# Patient Record
Sex: FEMALE | Race: WHITE | NOT HISPANIC OR LATINO
[De-identification: names, ages, dates, MRNs, and addresses within clinical notes are randomized per-mention and may not be internally consistent; named-entity substitution may affect disease eponyms.]

---

## 2017-01-19 ENCOUNTER — MESSAGE (OUTPATIENT)
Age: 57
End: 2017-01-19

## 2017-01-19 ENCOUNTER — MEDICATION RENEWAL (OUTPATIENT)
Age: 57
End: 2017-01-19

## 2017-03-07 ENCOUNTER — MESSAGE (OUTPATIENT)
Age: 57
End: 2017-03-07

## 2017-03-08 ENCOUNTER — MEDICATION RENEWAL (OUTPATIENT)
Age: 57
End: 2017-03-08

## 2017-03-13 ENCOUNTER — CLINICAL ADVICE (OUTPATIENT)
Age: 57
End: 2017-03-13

## 2017-05-04 ENCOUNTER — APPOINTMENT (OUTPATIENT)
Dept: PAIN MANAGEMENT | Facility: CLINIC | Age: 57
End: 2017-05-04

## 2017-05-04 VITALS
HEIGHT: 64 IN | BODY MASS INDEX: 26.63 KG/M2 | WEIGHT: 156 LBS | SYSTOLIC BLOOD PRESSURE: 118 MMHG | HEART RATE: 94 BPM | DIASTOLIC BLOOD PRESSURE: 76 MMHG

## 2017-05-04 RX ORDER — CYCLOBENZAPRINE HYDROCHLORIDE 10 MG/1
10 TABLET, FILM COATED ORAL DAILY
Refills: 0 | Status: ACTIVE | COMMUNITY
Start: 2017-05-04

## 2017-05-04 RX ORDER — DIHYDROERGOTAMINE MESYLATE 4 MG/ML
4 SPRAY NASAL
Qty: 16 | Refills: 2 | Status: DISCONTINUED | COMMUNITY
Start: 2017-03-13 | End: 2017-05-04

## 2017-06-08 ENCOUNTER — APPOINTMENT (OUTPATIENT)
Dept: PAIN MANAGEMENT | Facility: CLINIC | Age: 57
End: 2017-06-08

## 2017-06-26 ENCOUNTER — APPOINTMENT (OUTPATIENT)
Dept: PAIN MANAGEMENT | Facility: CLINIC | Age: 57
End: 2017-06-26

## 2017-06-26 VITALS
DIASTOLIC BLOOD PRESSURE: 80 MMHG | HEART RATE: 77 BPM | HEIGHT: 64 IN | BODY MASS INDEX: 25.95 KG/M2 | WEIGHT: 152 LBS | SYSTOLIC BLOOD PRESSURE: 126 MMHG

## 2017-06-26 RX ORDER — OLANZAPINE 5 MG/1
5 TABLET, FILM COATED ORAL
Qty: 14 | Refills: 0 | Status: DISCONTINUED | COMMUNITY
Start: 2017-03-16 | End: 2017-06-26

## 2017-07-31 ENCOUNTER — APPOINTMENT (OUTPATIENT)
Dept: PAIN MANAGEMENT | Facility: CLINIC | Age: 57
End: 2017-07-31
Payer: COMMERCIAL

## 2017-07-31 VITALS
BODY MASS INDEX: 25.95 KG/M2 | HEIGHT: 64 IN | WEIGHT: 152 LBS | HEART RATE: 87 BPM | DIASTOLIC BLOOD PRESSURE: 78 MMHG | SYSTOLIC BLOOD PRESSURE: 127 MMHG

## 2017-07-31 PROCEDURE — 99214 OFFICE O/P EST MOD 30 MIN: CPT

## 2017-07-31 RX ORDER — AZITHROMYCIN 250 MG/1
250 TABLET, FILM COATED ORAL
Qty: 6 | Refills: 0 | Status: COMPLETED | COMMUNITY
Start: 2016-12-27

## 2017-07-31 RX ORDER — NABUMETONE 750 MG/1
750 TABLET, FILM COATED ORAL
Qty: 45 | Refills: 1 | Status: DISCONTINUED | COMMUNITY
Start: 2017-05-04 | End: 2017-07-31

## 2017-08-28 ENCOUNTER — MEDICATION RENEWAL (OUTPATIENT)
Age: 57
End: 2017-08-28

## 2017-08-29 ENCOUNTER — MESSAGE (OUTPATIENT)
Age: 57
End: 2017-08-29

## 2017-09-25 ENCOUNTER — APPOINTMENT (OUTPATIENT)
Dept: PAIN MANAGEMENT | Facility: CLINIC | Age: 57
End: 2017-09-25
Payer: COMMERCIAL

## 2017-09-25 VITALS
DIASTOLIC BLOOD PRESSURE: 81 MMHG | HEART RATE: 95 BPM | SYSTOLIC BLOOD PRESSURE: 128 MMHG | HEIGHT: 64 IN | WEIGHT: 141 LBS | BODY MASS INDEX: 24.07 KG/M2

## 2017-09-25 PROCEDURE — 99213 OFFICE O/P EST LOW 20 MIN: CPT

## 2017-09-25 RX ORDER — PANTOPRAZOLE 40 MG/1
40 TABLET, DELAYED RELEASE ORAL
Qty: 30 | Refills: 0 | Status: DISCONTINUED | COMMUNITY
Start: 2016-03-07 | End: 2017-09-25

## 2017-11-06 ENCOUNTER — MEDICATION RENEWAL (OUTPATIENT)
Age: 57
End: 2017-11-06

## 2017-12-04 ENCOUNTER — APPOINTMENT (OUTPATIENT)
Dept: PAIN MANAGEMENT | Facility: CLINIC | Age: 57
End: 2017-12-04
Payer: COMMERCIAL

## 2017-12-04 VITALS
WEIGHT: 142 LBS | HEIGHT: 64 IN | BODY MASS INDEX: 24.24 KG/M2 | SYSTOLIC BLOOD PRESSURE: 122 MMHG | HEART RATE: 90 BPM | DIASTOLIC BLOOD PRESSURE: 88 MMHG

## 2017-12-04 PROCEDURE — 99213 OFFICE O/P EST LOW 20 MIN: CPT

## 2017-12-06 ENCOUNTER — CHART COPY (OUTPATIENT)
Age: 57
End: 2017-12-06

## 2018-02-15 ENCOUNTER — APPOINTMENT (OUTPATIENT)
Dept: PAIN MANAGEMENT | Facility: CLINIC | Age: 58
End: 2018-02-15

## 2018-02-21 ENCOUNTER — APPOINTMENT (OUTPATIENT)
Dept: PAIN MANAGEMENT | Facility: CLINIC | Age: 58
End: 2018-02-21
Payer: COMMERCIAL

## 2018-02-21 VITALS
BODY MASS INDEX: 24.41 KG/M2 | DIASTOLIC BLOOD PRESSURE: 74 MMHG | WEIGHT: 143 LBS | SYSTOLIC BLOOD PRESSURE: 120 MMHG | HEART RATE: 90 BPM | HEIGHT: 64 IN

## 2018-02-21 PROCEDURE — 99213 OFFICE O/P EST LOW 20 MIN: CPT

## 2018-04-03 ENCOUNTER — MEDICATION RENEWAL (OUTPATIENT)
Age: 58
End: 2018-04-03

## 2018-04-11 ENCOUNTER — APPOINTMENT (OUTPATIENT)
Dept: PAIN MANAGEMENT | Facility: CLINIC | Age: 58
End: 2018-04-11
Payer: COMMERCIAL

## 2018-04-11 VITALS
HEIGHT: 64 IN | SYSTOLIC BLOOD PRESSURE: 119 MMHG | DIASTOLIC BLOOD PRESSURE: 76 MMHG | WEIGHT: 145 LBS | BODY MASS INDEX: 24.75 KG/M2 | HEART RATE: 84 BPM

## 2018-04-11 PROCEDURE — 99213 OFFICE O/P EST LOW 20 MIN: CPT

## 2018-04-20 ENCOUNTER — OTHER (OUTPATIENT)
Age: 58
End: 2018-04-20

## 2018-04-27 ENCOUNTER — APPOINTMENT (OUTPATIENT)
Dept: MRI IMAGING | Facility: IMAGING CENTER | Age: 58
End: 2018-04-27

## 2018-05-09 ENCOUNTER — APPOINTMENT (OUTPATIENT)
Dept: PAIN MANAGEMENT | Facility: CLINIC | Age: 58
End: 2018-05-09
Payer: COMMERCIAL

## 2018-05-09 VITALS
BODY MASS INDEX: 24.75 KG/M2 | HEART RATE: 85 BPM | HEIGHT: 64 IN | WEIGHT: 145 LBS | SYSTOLIC BLOOD PRESSURE: 126 MMHG | DIASTOLIC BLOOD PRESSURE: 82 MMHG

## 2018-05-09 PROCEDURE — 99213 OFFICE O/P EST LOW 20 MIN: CPT

## 2018-06-14 ENCOUNTER — APPOINTMENT (OUTPATIENT)
Dept: PAIN MANAGEMENT | Facility: CLINIC | Age: 58
End: 2018-06-14
Payer: COMMERCIAL

## 2018-06-14 VITALS
HEART RATE: 87 BPM | BODY MASS INDEX: 24.75 KG/M2 | HEIGHT: 64 IN | SYSTOLIC BLOOD PRESSURE: 111 MMHG | DIASTOLIC BLOOD PRESSURE: 73 MMHG | WEIGHT: 145 LBS

## 2018-06-14 PROCEDURE — 99214 OFFICE O/P EST MOD 30 MIN: CPT

## 2018-06-14 RX ORDER — ICOSAPENT ETHYL 1000 MG/1
1 CAPSULE ORAL
Qty: 360 | Refills: 0 | Status: ACTIVE | COMMUNITY
Start: 2018-01-17

## 2018-06-14 RX ORDER — NABUMETONE 750 MG/1
750 TABLET, FILM COATED ORAL
Qty: 14 | Refills: 1 | Status: DISCONTINUED | COMMUNITY
Start: 2017-12-04 | End: 2018-06-14

## 2018-06-14 RX ORDER — ALBUTEROL SULFATE 90 UG/1
108 (90 BASE) AEROSOL, METERED RESPIRATORY (INHALATION)
Qty: 9 | Refills: 0 | Status: ACTIVE | COMMUNITY
Start: 2018-02-01

## 2018-07-02 ENCOUNTER — APPOINTMENT (OUTPATIENT)
Dept: PAIN MANAGEMENT | Facility: CLINIC | Age: 58
End: 2018-07-02
Payer: COMMERCIAL

## 2018-07-02 VITALS
SYSTOLIC BLOOD PRESSURE: 114 MMHG | HEART RATE: 75 BPM | HEIGHT: 64 IN | WEIGHT: 145 LBS | BODY MASS INDEX: 24.75 KG/M2 | DIASTOLIC BLOOD PRESSURE: 75 MMHG

## 2018-07-02 PROCEDURE — 99212 OFFICE O/P EST SF 10 MIN: CPT | Mod: 25

## 2018-07-02 PROCEDURE — 20551 NJX 1 TENDON ORIGIN/INSJ: CPT

## 2018-10-29 RX ORDER — ERENUMAB-AOOE 70 MG/ML
70 INJECTION SUBCUTANEOUS
Qty: 1 | Refills: 5 | Status: DISCONTINUED | COMMUNITY
Start: 2018-06-14 | End: 2018-10-29

## 2018-12-24 ENCOUNTER — APPOINTMENT (OUTPATIENT)
Dept: NEUROLOGY | Facility: CLINIC | Age: 58
End: 2018-12-24

## 2019-02-06 ENCOUNTER — APPOINTMENT (OUTPATIENT)
Dept: PAIN MANAGEMENT | Facility: CLINIC | Age: 59
End: 2019-02-06
Payer: MEDICAID

## 2019-02-06 VITALS
DIASTOLIC BLOOD PRESSURE: 69 MMHG | SYSTOLIC BLOOD PRESSURE: 102 MMHG | HEART RATE: 79 BPM | HEIGHT: 64 IN | BODY MASS INDEX: 24.92 KG/M2 | WEIGHT: 146 LBS

## 2019-02-06 PROCEDURE — 99213 OFFICE O/P EST LOW 20 MIN: CPT

## 2019-02-06 RX ORDER — DULOXETINE HYDROCHLORIDE 20 MG/1
20 CAPSULE, DELAYED RELEASE PELLETS ORAL
Qty: 30 | Refills: 0 | Status: DISCONTINUED | COMMUNITY
Start: 2018-02-21 | End: 2019-02-06

## 2019-02-06 RX ORDER — PROTRIPTYLINE HYDROCHLORIDE 10 MG/1
10 TABLET, FILM COATED ORAL
Qty: 60 | Refills: 2 | Status: DISCONTINUED | COMMUNITY
Start: 2017-10-04 | End: 2019-02-06

## 2019-02-08 NOTE — REVIEW OF SYSTEMS
[Fever] : no fever [Chills] : no chills [Chest Pain] : no chest pain [Palpitations] : no palpitations [Shortness Of Breath] : no shortness of breath [Dizziness] : no dizziness [Fainting] : no fainting

## 2019-02-08 NOTE — PHYSICAL EXAM
[General Appearance - Alert] : alert [General Appearance - In No Acute Distress] : in no acute distress [Oriented To Time, Place, And Person] : oriented to person, place, and time [Affect] : the affect was normal [Mood] : the mood was normal [Cranial Nerves Facial (VII)] : face symmetrical [Cranial Nerves Accessory (XI - Cranial And Spinal)] : head turning and shoulder shrug symmetric [Cranial Nerves Hypoglossal (XII)] : there was no tongue deviation with protrusion [Motor Strength] : muscle strength was normal in all four extremities [Sclera] : the sclera and conjunctiva were normal [PERRL With Normal Accommodation] : pupils were equal in size, round, reactive to light, with normal accommodation [Extraocular Movements] : extraocular movements were intact [] : no respiratory distress [Apical Impulse] : the apical impulse was normal [Heart Rate And Rhythm] : heart rate was normal and rhythm regular [Edema] : there was no peripheral edema [Abnormal Walk] : normal gait [Paresis Pronator Drift Right-Sided] : no pronator drift on the right [Paresis Pronator Drift Left-Sided] : no pronator drift on the left [Motor Strength Upper Extremities Bilaterally] : strength was normal in both upper extremities [Motor Strength Lower Extremities Bilaterally] : strength was normal in both lower extremities [Coordination - Dysmetria Impaired Finger-to-Nose Bilateral] : not present

## 2019-02-08 NOTE — HISTORY OF PRESENT ILLNESS
[Headache] : headache [Nausea] : nausea [Photophobia] : photophobia [Phonophobia] : phonophobia [Daily] : daily [FreeTextEntry1] : REturns today for a followup visit. \par Migraine is almost constant. Is waiting for delivery of  Emgality. Did have 2 month sof Emgality ( November and December ) did not have coverage for January. I was able to do a PA and a 3 month approval was given. Pt believes she did have some relief with Emgality. \par Often has a difficult time leaving her home due to migraine pain. \par \par No new symptoms. \par Mood is stable/ \par

## 2019-03-21 ENCOUNTER — MESSAGE (OUTPATIENT)
Age: 59
End: 2019-03-21

## 2019-03-21 ENCOUNTER — APPOINTMENT (OUTPATIENT)
Dept: PAIN MANAGEMENT | Facility: CLINIC | Age: 59
End: 2019-03-21
Payer: MEDICAID

## 2019-03-21 ENCOUNTER — TRANSCRIPTION ENCOUNTER (OUTPATIENT)
Age: 59
End: 2019-03-21

## 2019-03-21 VITALS
BODY MASS INDEX: 25.78 KG/M2 | SYSTOLIC BLOOD PRESSURE: 116 MMHG | WEIGHT: 151 LBS | HEIGHT: 64 IN | HEART RATE: 89 BPM | DIASTOLIC BLOOD PRESSURE: 70 MMHG

## 2019-03-21 PROCEDURE — 99214 OFFICE O/P EST MOD 30 MIN: CPT

## 2019-03-21 RX ORDER — HYDROXYZINE HYDROCHLORIDE 10 MG/1
10 TABLET ORAL
Qty: 10 | Refills: 0 | Status: DISCONTINUED | COMMUNITY
Start: 2017-12-28 | End: 2019-03-21

## 2019-03-21 RX ORDER — GALCANEZUMAB 120 MG/ML
120 INJECTION, SOLUTION SUBCUTANEOUS
Qty: 2 | Refills: 0 | Status: DISCONTINUED | COMMUNITY
Start: 2018-10-29 | End: 2019-03-21

## 2019-03-21 NOTE — REVIEW OF SYSTEMS
[Fever] : no fever [Chills] : no chills [Feeling Poorly] : feeling poorly [Feeling Tired] : feeling tired [Eyesight Problems] : no eyesight problems [Loss Of Hearing] : no hearing loss [As Noted in HPI] : as noted in HPI [Chest Pain] : no chest pain [Palpitations] : palpitations [Cough] : no cough [Arthralgias] : arthralgias [Joint Pain] : joint pain [Neck Pain] : neck pain [Skin Lesions] : no skin lesions [Skin Wound] : no skin wound [Itching] : no itching [Dizziness] : dizziness [Sleep Disturbances] : sleep disturbances [Anxiety] : anxiety [Muscle Weakness] : no muscle weakness [Swollen Glands] : no swollen glands

## 2019-03-21 NOTE — HISTORY OF PRESENT ILLNESS
[FreeTextEntry1] : Pt notes that she has been having problems because she had insurance issues that delayed her Botox, delayed her Emgality and her other meds.  Since then she has had been worsening since this last Jan.\par Now having pain into her neck, into left side of her face and feels that the headache pain, nausea and vertigo have been limiting her.\par Does use valium when she gets exhausted and feels that is the vertigo.\par Now had Botox last month and got Emgality last week.\par Notes that in the 2 months she wasn't on treatment things had gradually progressed. [Chronic Headache] : chronic headache [Nausea] : nausea [Photophobia] : photophobia [Phonophobia] : phonophobia [Neck Pain] : neck pain [Daily] : daily [> 4 hours] : > 4 hours [Worsened] : The patient reports ~his/her~ symptoms since the last visit have worsened

## 2019-03-21 NOTE — ASSESSMENT
[FreeTextEntry1] : Would give 4 day toradol bridge\par if ineffective to call for dexamethasone taper\par continue with Emgality and Botox\par

## 2019-03-21 NOTE — PHYSICAL EXAM
[General Appearance - Alert] : alert [General Appearance - In No Acute Distress] : in no acute distress [General Appearance - Well Nourished] : well nourished [General Appearance - Well Developed] : well developed [General Appearance - Well-Appearing] : healthy appearing [Oriented To Time, Place, And Person] : oriented to person, place, and time [Impaired Insight] : insight and judgment were intact [Affect] : the affect was normal [Mood] : the mood was normal [Memory Recent] : recent memory was not impaired [Memory Remote] : remote memory was not impaired [Person] : oriented to person [Place] : oriented to place [Time] : oriented to time [Short Term Intact] : short term memory intact [Remote Intact] : remote memory intact [Registration Intact] : recent registration memory intact [Concentration Intact] : normal concentrating ability [Visual Intact] : visual attention was ~T not ~L decreased [Fluency] : fluency intact [Comprehension] : comprehension intact [Current Events] : adequate knowledge of current events [Past History] : adequate knowledge of personal past history [Vocabulary] : adequate range of vocabulary [Cranial Nerves Optic (II)] : visual acuity intact bilaterally,  visual fields full to confrontation, pupils equal round and reactive to light [Cranial Nerves Oculomotor (III)] : extraocular motion intact [Cranial Nerves Trigeminal (V)] : facial sensation intact symmetrically [Cranial Nerves Facial (VII)] : face symmetrical [Cranial Nerves Vestibulocochlear (VIII)] : hearing was intact bilaterally [Cranial Nerves Glossopharyngeal (IX)] : tongue and palate midline [Cranial Nerves Accessory (XI - Cranial And Spinal)] : head turning and shoulder shrug symmetric [Cranial Nerves Hypoglossal (XII)] : there was no tongue deviation with protrusion [Motor Tone] : muscle tone was normal in all four extremities [Motor Strength] : muscle strength was normal in all four extremities [Involuntary Movements] : no involuntary movements were seen [No Muscle Atrophy] : normal bulk in all four extremities [Motor Handedness Right-Handed] : the patient is right hand dominant [Motor Strength Upper Extremities Bilaterally] : strength was normal in both upper extremities [Motor Strength Lower Extremities Bilaterally] : strength was normal in both lower extremities [Sensation Tactile Decrease] : light touch was intact [Allodynia] : ~T allodynia present [Balance] : balance was intact [Limited Balance] : balance was intact [Past-pointing] : there was no past-pointing [Dysdiadochokinesia Bilaterally] : not present [Sclera] : the sclera and conjunctiva were normal [PERRL With Normal Accommodation] : pupils were equal in size, round, reactive to light, with normal accommodation [Extraocular Movements] : extraocular movements were intact [Hearing Threshold Finger Rub Not [place]] : hearing was normal [FreeTextEntry1] : moderate decrease range of mtoin in paraspinals [Exaggerated Use Of Accessory Muscles For Inspiration] : no accessory muscle use [Edema] : there was no peripheral edema [No Spinal Tenderness] : no spinal tenderness [Abnormal Walk] : normal gait [Nail Clubbing] : no clubbing  or cyanosis of the fingernails [Musculoskeletal - Swelling] : no joint swelling seen [Skin Color & Pigmentation] : normal skin color and pigmentation [Skin Turgor] : normal skin turgor [] : no rash

## 2019-04-17 ENCOUNTER — MEDICATION RENEWAL (OUTPATIENT)
Age: 59
End: 2019-04-17

## 2019-05-22 ENCOUNTER — APPOINTMENT (OUTPATIENT)
Dept: PAIN MANAGEMENT | Facility: CLINIC | Age: 59
End: 2019-05-22
Payer: MEDICAID

## 2019-05-22 VITALS
WEIGHT: 151 LBS | BODY MASS INDEX: 25.78 KG/M2 | HEIGHT: 64 IN | SYSTOLIC BLOOD PRESSURE: 128 MMHG | HEART RATE: 89 BPM | DIASTOLIC BLOOD PRESSURE: 83 MMHG

## 2019-05-22 PROCEDURE — 99214 OFFICE O/P EST MOD 30 MIN: CPT

## 2019-05-22 NOTE — HISTORY OF PRESENT ILLNESS
[FreeTextEntry1] : Pt rtc and notes that she still continues to do poorly despite attempts at prevention.  Did feel that Aimovig and Emgality were intolerable for her and had minimal effect on her headache and she still continues to have vertigo with only moderate benefit with valium.\par  [Headache] : headache [Nausea] : nausea [Photophobia] : photophobia [Phonophobia] : phonophobia [Scalp Tenderness] : scalp tenderness [> 15 days per month] : > 15 days per month [> 4 hours] : > 4 hours [stayed the same] : stayed the same [4] : a minimum pain level of 4/10 [9] : a maximum pain level of 9/10 [Worsened] : The patient reports ~his/her~ symptoms since the last visit have worsened

## 2019-05-22 NOTE — PHYSICAL EXAM
[General Appearance - Alert] : alert [General Appearance - In No Acute Distress] : in no acute distress [General Appearance - Well Nourished] : well nourished [General Appearance - Well Developed] : well developed [General Appearance - Well-Appearing] : healthy appearing [Oriented To Time, Place, And Person] : oriented to person, place, and time [Impaired Insight] : insight and judgment were intact [Affect] : the affect was normal [Mood] : the mood was normal [Memory Recent] : recent memory was not impaired [Memory Remote] : remote memory was not impaired [Person] : oriented to person [Place] : oriented to place [Time] : oriented to time [Short Term Intact] : short term memory intact [Remote Intact] : remote memory intact [Registration Intact] : recent registration memory intact [Concentration Intact] : normal concentrating ability [Visual Intact] : visual attention was ~T not ~L decreased [Fluency] : fluency intact [Comprehension] : comprehension intact [Current Events] : adequate knowledge of current events [Past History] : adequate knowledge of personal past history [Vocabulary] : adequate range of vocabulary [Cranial Nerves Optic (II)] : visual acuity intact bilaterally,  visual fields full to confrontation, pupils equal round and reactive to light [Cranial Nerves Oculomotor (III)] : extraocular motion intact [Cranial Nerves Trigeminal (V)] : facial sensation intact symmetrically [Cranial Nerves Facial (VII)] : face symmetrical [Cranial Nerves Vestibulocochlear (VIII)] : hearing was intact bilaterally [Cranial Nerves Glossopharyngeal (IX)] : tongue and palate midline [Cranial Nerves Accessory (XI - Cranial And Spinal)] : head turning and shoulder shrug symmetric [Cranial Nerves Hypoglossal (XII)] : there was no tongue deviation with protrusion [Motor Tone] : muscle tone was normal in all four extremities [Motor Strength] : muscle strength was normal in all four extremities [Involuntary Movements] : no involuntary movements were seen [No Muscle Atrophy] : normal bulk in all four extremities [Motor Handedness Right-Handed] : the patient is right hand dominant [Motor Strength Upper Extremities Bilaterally] : strength was normal in both upper extremities [Motor Strength Lower Extremities Bilaterally] : strength was normal in both lower extremities [Sensation Tactile Decrease] : light touch was intact [Allodynia] : ~T allodynia present [Balance] : balance was intact [Limited Balance] : balance was intact [Past-pointing] : there was no past-pointing [Dysdiadochokinesia Bilaterally] : not present [Sclera] : the sclera and conjunctiva were normal [PERRL With Normal Accommodation] : pupils were equal in size, round, reactive to light, with normal accommodation [Extraocular Movements] : extraocular movements were intact [Hearing Threshold Finger Rub Not [place]] : hearing was normal [FreeTextEntry1] : moderate decrease range of mtoin in paraspinals [Exaggerated Use Of Accessory Muscles For Inspiration] : no accessory muscle use [Edema] : there was no peripheral edema [No Spinal Tenderness] : no spinal tenderness [Abnormal Walk] : normal gait [Nail Clubbing] : no clubbing  or cyanosis of the fingernails [Musculoskeletal - Swelling] : no joint swelling seen [Skin Color & Pigmentation] : normal skin color and pigmentation [Skin Turgor] : normal skin turgor [] : no rash

## 2019-05-22 NOTE — REVIEW OF SYSTEMS
[Fever] : no fever [Chills] : no chills [Feeling Poorly] : feeling poorly [Feeling Tired] : feeling tired [Eyesight Problems] : no eyesight problems [Wheezing] : no wheezing [Cough] : no cough [Arthralgias] : arthralgias [Neck Pain] : neck pain [Dizziness] : dizziness [Fainting] : no fainting [FreeTextEntry9] : keith spasm [de-identified] : vertigo

## 2019-08-01 ENCOUNTER — MESSAGE (OUTPATIENT)
Age: 59
End: 2019-08-01

## 2019-08-01 ENCOUNTER — MEDICATION RENEWAL (OUTPATIENT)
Age: 59
End: 2019-08-01

## 2019-08-21 ENCOUNTER — APPOINTMENT (OUTPATIENT)
Dept: PAIN MANAGEMENT | Facility: CLINIC | Age: 59
End: 2019-08-21
Payer: MEDICAID

## 2019-08-21 VITALS
WEIGHT: 146 LBS | DIASTOLIC BLOOD PRESSURE: 82 MMHG | BODY MASS INDEX: 24.92 KG/M2 | HEART RATE: 77 BPM | SYSTOLIC BLOOD PRESSURE: 125 MMHG | HEIGHT: 64 IN

## 2019-08-21 PROCEDURE — 99213 OFFICE O/P EST LOW 20 MIN: CPT

## 2019-08-21 RX ORDER — MEMANTINE HYDROCHLORIDE 7 MG/1
7 CAPSULE, EXTENDED RELEASE ORAL
Qty: 60 | Refills: 3 | Status: DISCONTINUED | COMMUNITY
Start: 2019-05-22 | End: 2019-08-21

## 2019-08-21 RX ORDER — GALCANEZUMAB 120 MG/ML
120 INJECTION, SOLUTION SUBCUTANEOUS
Qty: 1 | Refills: 2 | Status: DISCONTINUED | COMMUNITY
Start: 2018-11-07 | End: 2019-08-21

## 2019-08-21 NOTE — PHYSICAL EXAM
[General Appearance - Alert] : alert [General Appearance - In No Acute Distress] : in no acute distress [General Appearance - Well-Appearing] : healthy appearing [Oriented To Time, Place, And Person] : oriented to person, place, and time [Affect] : the affect was normal [Cranial Nerves Facial (VII)] : face symmetrical [Mood] : the mood was normal [Cranial Nerves Accessory (XI - Cranial And Spinal)] : head turning and shoulder shrug symmetric [Cranial Nerves Hypoglossal (XII)] : there was no tongue deviation with protrusion [Motor Strength] : muscle strength was normal in all four extremities [PERRL With Normal Accommodation] : pupils were equal in size, round, reactive to light, with normal accommodation [Sclera] : the sclera and conjunctiva were normal [Extraocular Movements] : extraocular movements were intact [] : no respiratory distress [Edema] : there was no peripheral edema [Abnormal Walk] : normal gait [Paresis Pronator Drift Right-Sided] : no pronator drift on the right [Paresis Pronator Drift Left-Sided] : no pronator drift on the left [Motor Strength Upper Extremities Bilaterally] : strength was normal in both upper extremities [Motor Strength Lower Extremities Bilaterally] : strength was normal in both lower extremities [Coordination - Dysmetria Impaired Finger-to-Nose Bilateral] : not present

## 2019-08-21 NOTE — ASSESSMENT
[FreeTextEntry1] : Continue Memantidine 5mg BID . \par Continue Botox injections every 12 weeks. \par RTO 3 months.

## 2019-08-21 NOTE — HISTORY OF PRESENT ILLNESS
[Headache] : headache [Nausea] : nausea [Dizziness] : dizziness [___ Times Per Week] : [unfilled] times each week [Photophobia] : photophobia [FreeTextEntry1] : Patient returns today for a follow up visit. \juanita Continues to have frequent headaches .\juanita Has a migraine / headache every 2 days . \juanita Has been tolerating Memantidine 5mg BID , denies any adverse effects. \juanita Is due for Botox injections and has an appt today.

## 2019-11-29 ENCOUNTER — MEDICATION RENEWAL (OUTPATIENT)
Age: 59
End: 2019-11-29

## 2020-01-06 ENCOUNTER — APPOINTMENT (OUTPATIENT)
Dept: PAIN MANAGEMENT | Facility: CLINIC | Age: 60
End: 2020-01-06
Payer: MEDICAID

## 2020-01-06 VITALS
SYSTOLIC BLOOD PRESSURE: 118 MMHG | HEIGHT: 64 IN | WEIGHT: 150 LBS | HEART RATE: 78 BPM | BODY MASS INDEX: 25.61 KG/M2 | DIASTOLIC BLOOD PRESSURE: 75 MMHG

## 2020-01-06 PROCEDURE — 99214 OFFICE O/P EST MOD 30 MIN: CPT

## 2020-01-09 NOTE — HISTORY OF PRESENT ILLNESS
[FreeTextEntry1] : Pt rtc and notes myriad of issues that affect her- including occipital neuralgia?, facial neuralgia, cognitive impairment, chronic daily headache.  She very rapidly listed several of these and how the combination of her conditions makes it more difficult for her to function. \par Additionally, she notes that her symptoms are unpredictable and can often flare without prewarning.  This leaves her unable to know when she is going to acutely decline and finds that can be just as limiting.  She continues to find it difficult to attend social or family events.  Has avoided other ADLs because of her conditions and fear of exacerbations.\par We discussed increase in memantine at previous visit and pt expresses interest in doing so.\par She has brought in paperwork for disability to be completed by this office to represent the multiple issues she continues to try to manage. [Chronic Headache] : chronic headache [Dizziness] : dizziness [Nausea] : nausea [Photophobia] : photophobia [Phonophobia] : phonophobia [Neck Pain] : neck pain [Scalp Tenderness] : scalp tenderness [> 15 days per month] : > 15 days per month [stayed the same] : stayed the same [> 4 hours] : > 4 hours [3] : a minimum pain level of 3/10 [10] : a maximum pain level of 10/10 [Stable] : The patient reports ~his/her~ symptoms since the last visit are stable

## 2020-01-09 NOTE — PHYSICAL EXAM
[General Appearance - Alert] : alert [General Appearance - In No Acute Distress] : in no acute distress [General Appearance - Well Nourished] : well nourished [General Appearance - Well Developed] : well developed [General Appearance - Well-Appearing] : healthy appearing [Oriented To Time, Place, And Person] : oriented to person, place, and time [Impaired Insight] : insight and judgment were intact [Affect] : the affect was normal [Memory Recent] : recent memory was not impaired [Mood] : the mood was normal [Memory Remote] : remote memory was not impaired [Person] : oriented to person [Place] : oriented to place [Time] : oriented to time [Short Term Intact] : short term memory intact [Remote Intact] : remote memory intact [Registration Intact] : recent registration memory intact [Concentration Intact] : normal concentrating ability [Visual Intact] : visual attention was ~T not ~L decreased [Fluency] : fluency intact [Comprehension] : comprehension intact [Current Events] : adequate knowledge of current events [Past History] : adequate knowledge of personal past history [Vocabulary] : adequate range of vocabulary [Cranial Nerves Optic (II)] : visual acuity intact bilaterally,  visual fields full to confrontation, pupils equal round and reactive to light [Cranial Nerves Trigeminal (V)] : facial sensation intact symmetrically [Cranial Nerves Oculomotor (III)] : extraocular motion intact [Cranial Nerves Facial (VII)] : face symmetrical [Cranial Nerves Vestibulocochlear (VIII)] : hearing was intact bilaterally [Cranial Nerves Glossopharyngeal (IX)] : tongue and palate midline [Cranial Nerves Accessory (XI - Cranial And Spinal)] : head turning and shoulder shrug symmetric [Motor Tone] : muscle tone was normal in all four extremities [Cranial Nerves Hypoglossal (XII)] : there was no tongue deviation with protrusion [Motor Strength] : muscle strength was normal in all four extremities [Motor Handedness Right-Handed] : the patient is right hand dominant [No Muscle Atrophy] : normal bulk in all four extremities [Sensation Tactile Decrease] : light touch was intact [Allodynia] : ~T allodynia present [Balance] : balance was intact [Sclera] : the sclera and conjunctiva were normal [PERRL With Normal Accommodation] : pupils were equal in size, round, reactive to light, with normal accommodation [Extraocular Movements] : extraocular movements were intact [Hearing Threshold Finger Rub Not [place]] : hearing was normal [Exaggerated Use Of Accessory Muscles For Inspiration] : no accessory muscle use [Edema] : there was no peripheral edema [Abnormal Walk] : normal gait [Nail Clubbing] : no clubbing  or cyanosis of the fingernails [Musculoskeletal - Swelling] : no joint swelling seen [Skin Color & Pigmentation] : normal skin color and pigmentation [] : no rash [Motor Strength Upper Extremities Bilaterally] : strength was normal in both upper extremities [Motor Strength Lower Extremities Bilaterally] : strength was normal in both lower extremities [Limited Balance] : balance was intact [Dysdiadochokinesia Bilaterally] : not present [Past-pointing] : there was no past-pointing [FreeTextEntry1] : moderate decrease range of mtoin in paraspinals [Involuntary Movements] : no involuntary movements were seen

## 2020-01-09 NOTE — REVIEW OF SYSTEMS
[Fever] : no fever [Chills] : no chills [Feeling Poorly] : feeling poorly [Feeling Tired] : feeling tired [Recent Weight Gain (___ Lbs)] : no recent weight gain [Recent Weight Loss (___ Lbs)] : no recent weight loss [Eye Pain] : eye pain [Red Eyes] : eyes not red [Eyesight Problems] : eyesight problems [Loss Of Hearing] : no hearing loss [Nosebleeds] : no nosebleeds [Chest Pain] : no chest pain [Palpitations] : palpitations [Shortness Of Breath] : shortness of breath [Arthralgias] : arthralgias [Cough] : no cough [Neck Pain] : neck pain [Joint Stiffness] : joint stiffness [Skin Wound] : no skin wound [Skin Lesions] : no skin lesions [Itching] : no itching [Convulsions] : no convulsions [Dizziness] : dizziness [Fainting] : no fainting [Sleep Disturbances] : sleep disturbances [Muscle Weakness] : no muscle weakness

## 2020-01-09 NOTE — ASSESSMENT
[FreeTextEntry1] : To continue with current medications except to increase memantine to 10mg bid\par consider for use of alternative prevention with cgrp antibody\par consider use of theranica patch\par consider use of gpant acutely\par rtc 2 months.\par will fill out paperwork re: disability.

## 2020-02-21 ENCOUNTER — TRANSCRIPTION ENCOUNTER (OUTPATIENT)
Age: 60
End: 2020-02-21

## 2020-05-07 ENCOUNTER — TRANSCRIPTION ENCOUNTER (OUTPATIENT)
Age: 60
End: 2020-05-07

## 2020-06-22 ENCOUNTER — APPOINTMENT (OUTPATIENT)
Dept: PAIN MANAGEMENT | Facility: CLINIC | Age: 60
End: 2020-06-22
Payer: MEDICARE

## 2020-06-22 VITALS
HEART RATE: 80 BPM | SYSTOLIC BLOOD PRESSURE: 131 MMHG | WEIGHT: 151 LBS | DIASTOLIC BLOOD PRESSURE: 76 MMHG | BODY MASS INDEX: 25.78 KG/M2 | HEIGHT: 64 IN

## 2020-06-22 VITALS — TEMPERATURE: 98.2 F

## 2020-06-22 PROCEDURE — 99214 OFFICE O/P EST MOD 30 MIN: CPT

## 2020-06-22 NOTE — REVIEW OF SYSTEMS
[Fever] : no fever [Chills] : no chills [Feeling Poorly] : feeling poorly [Feeling Tired] : feeling tired [Eyesight Problems] : no eyesight problems [Loss Of Hearing] : no hearing loss [Nosebleeds] : no nosebleeds [Nasal Discharge] : no nasal discharge [Chest Pain] : no chest pain [Palpitations] : no palpitations [Shortness Of Breath] : no shortness of breath [Cough] : no cough [Constipation] : no constipation [Diarrhea] : no diarrhea [Arthralgias] : arthralgias [Neck Pain] : neck pain [Skin Lesions] : no skin lesions [Skin Wound] : no skin wound [Itching] : no itching [Dizziness] : dizziness [Swollen Glands] : no swollen glands [Sleep Disturbances] : sleep disturbances [de-identified] : vertigo with migraine

## 2020-06-22 NOTE — PHYSICAL EXAM
[General Appearance - In No Acute Distress] : in no acute distress [General Appearance - Alert] : alert [General Appearance - Well-Appearing] : healthy appearing [General Appearance - Well Nourished] : well nourished [General Appearance - Well Developed] : well developed [Oriented To Time, Place, And Person] : oriented to person, place, and time [Affect] : the affect was normal [Impaired Insight] : insight and judgment were intact [Mood] : the mood was normal [Memory Remote] : remote memory was not impaired [Memory Recent] : recent memory was not impaired [Place] : oriented to place [Person] : oriented to person [Time] : oriented to time [Registration Intact] : recent registration memory intact [Short Term Intact] : short term memory intact [Remote Intact] : remote memory intact [Visual Intact] : visual attention was ~T not ~L decreased [Concentration Intact] : normal concentrating ability [Fluency] : fluency intact [Comprehension] : comprehension intact [Past History] : adequate knowledge of personal past history [Current Events] : adequate knowledge of current events [Vocabulary] : adequate range of vocabulary [Cranial Nerves Optic (II)] : visual acuity intact bilaterally,  visual fields full to confrontation, pupils equal round and reactive to light [Cranial Nerves Oculomotor (III)] : extraocular motion intact [Cranial Nerves Trigeminal (V)] : facial sensation intact symmetrically [Cranial Nerves Vestibulocochlear (VIII)] : hearing was intact bilaterally [Cranial Nerves Glossopharyngeal (IX)] : tongue and palate midline [Cranial Nerves Facial (VII)] : face symmetrical [Cranial Nerves Accessory (XI - Cranial And Spinal)] : head turning and shoulder shrug symmetric [Motor Tone] : muscle tone was normal in all four extremities [Cranial Nerves Hypoglossal (XII)] : there was no tongue deviation with protrusion [No Muscle Atrophy] : normal bulk in all four extremities [Motor Strength] : muscle strength was normal in all four extremities [Motor Strength Upper Extremities Bilaterally] : strength was normal in both upper extremities [Motor Handedness Right-Handed] : the patient is right hand dominant [Motor Strength Lower Extremities Bilaterally] : strength was normal in both lower extremities [Sensation Tactile Decrease] : light touch was intact [Allodynia] : ~T allodynia present [Limited Balance] : balance was intact [Past-pointing] : there was no past-pointing [Balance] : balance was intact [Dysdiadochokinesia Bilaterally] : not present [Neck Appearance] : the appearance of the neck was normal [FreeTextEntry1] : moderate decrease range of mtoin in paraspinals [Exaggerated Use Of Accessory Muscles For Inspiration] : no accessory muscle use [Edema] : there was no peripheral edema [Abnormal Walk] : normal gait [Involuntary Movements] : no involuntary movements were seen [Nail Clubbing] : no clubbing  or cyanosis of the fingernails [Musculoskeletal - Swelling] : no joint swelling seen [Skin Color & Pigmentation] : normal skin color and pigmentation [] : no rash

## 2020-06-22 NOTE — ASSESSMENT
[FreeTextEntry1] : Pt still notes teeth, jaw pain, vertigo, ftigue and multiple other somatic complaints in association with her migraine.  Notes that it all varies and can make it more difficult to manage her condition.\par WOuld plan for trial of pnr nurtec.

## 2020-10-05 ENCOUNTER — APPOINTMENT (OUTPATIENT)
Dept: PAIN MANAGEMENT | Facility: CLINIC | Age: 60
End: 2020-10-05

## 2020-10-24 RX ORDER — DEXAMETHASONE 4 MG/1
4 TABLET ORAL
Qty: 12 | Refills: 0 | Status: DISCONTINUED | COMMUNITY
Start: 2019-03-28 | End: 2020-10-24

## 2020-10-24 RX ORDER — FROVATRIPTAN SUCCINATE 2.5 MG/1
2.5 TABLET, FILM COATED ORAL
Qty: 8 | Refills: 0 | Status: DISCONTINUED | COMMUNITY
Start: 2017-05-04 | End: 2020-10-24

## 2020-10-24 RX ORDER — FLURBIPROFEN 100 MG
100 TABLET ORAL
Qty: 14 | Refills: 0 | Status: DISCONTINUED | COMMUNITY
Start: 2017-10-27 | End: 2020-10-24

## 2020-11-10 ENCOUNTER — APPOINTMENT (OUTPATIENT)
Dept: PAIN MANAGEMENT | Facility: CLINIC | Age: 60
End: 2020-11-10
Payer: MEDICARE

## 2020-11-10 VITALS — TEMPERATURE: 97.3 F

## 2020-11-10 VITALS
DIASTOLIC BLOOD PRESSURE: 75 MMHG | HEIGHT: 64 IN | SYSTOLIC BLOOD PRESSURE: 114 MMHG | BODY MASS INDEX: 25.44 KG/M2 | HEART RATE: 80 BPM | WEIGHT: 149 LBS

## 2020-11-10 PROCEDURE — 99213 OFFICE O/P EST LOW 20 MIN: CPT

## 2020-11-10 RX ORDER — RIMEGEPANT SULFATE 75 MG/75MG
75 TABLET, ORALLY DISINTEGRATING ORAL
Qty: 2 | Refills: 3 | Status: ACTIVE | COMMUNITY
Start: 2020-06-22 | End: 1900-01-01

## 2020-11-10 NOTE — REVIEW OF SYSTEMS
[Fever] : no fever [Chills] : no chills [Chest Pain] : no chest pain [Palpitations] : no palpitations [Shortness Of Breath] : no shortness of breath [Dizziness] : dizziness [Fainting] : no fainting

## 2020-11-10 NOTE — ASSESSMENT
[FreeTextEntry1] : Pt to schedule appt for occipital nerve blocks. \par No changes in medications. \par RTO 2 months \par Dr Hitchcock on site , billed incident to service.

## 2020-11-10 NOTE — PHYSICAL EXAM
[General Appearance - Alert] : alert [General Appearance - Well-Appearing] : healthy appearing [Oriented To Time, Place, And Person] : oriented to person, place, and time [Affect] : the affect was normal [Mood] : the mood was normal [Cranial Nerves Facial (VII)] : face symmetrical [Cranial Nerves Accessory (XI - Cranial And Spinal)] : head turning and shoulder shrug symmetric [Cranial Nerves Hypoglossal (XII)] : there was no tongue deviation with protrusion [Motor Strength] : muscle strength was normal in all four extremities [Paresis Pronator Drift Right-Sided] : no pronator drift on the right [Paresis Pronator Drift Left-Sided] : no pronator drift on the left [Motor Strength Upper Extremities Bilaterally] : strength was normal in both upper extremities [Motor Strength Lower Extremities Bilaterally] : strength was normal in both lower extremities [Abnormal Walk] : normal gait [Sclera] : the sclera and conjunctiva were normal [PERRL With Normal Accommodation] : pupils were equal in size, round, reactive to light, with normal accommodation [Extraocular Movements] : extraocular movements were intact [] : no respiratory distress [Edema] : there was no peripheral edema

## 2020-11-10 NOTE — HISTORY OF PRESENT ILLNESS
[FreeTextEntry1] : Pt returns today for a follow up visit . \par SHe continues to have frequent migraine . Had Botox last in October . She did not have the occipital nerve block that is helpful for left eye pain. \par Pt had tried Nurtec and felt she had some minor relief in  migraine pain. \par Pt denies any new symptoms and is tolerating medications well. \par  [Headache] : headache [Nausea] : nausea [Photophobia] : photophobia [Phonophobia] : phonophobia

## 2021-01-11 ENCOUNTER — APPOINTMENT (OUTPATIENT)
Dept: PAIN MANAGEMENT | Facility: CLINIC | Age: 61
End: 2021-01-11
Payer: MEDICARE

## 2021-01-11 VITALS
HEIGHT: 64 IN | WEIGHT: 147 LBS | DIASTOLIC BLOOD PRESSURE: 70 MMHG | BODY MASS INDEX: 25.1 KG/M2 | SYSTOLIC BLOOD PRESSURE: 115 MMHG | HEART RATE: 82 BPM

## 2021-01-11 VITALS — TEMPERATURE: 97 F

## 2021-01-11 PROCEDURE — 99213 OFFICE O/P EST LOW 20 MIN: CPT

## 2021-01-11 NOTE — HISTORY OF PRESENT ILLNESS
[FreeTextEntry1] : Pt returns today for a followup visit. \par SHe is due for Botox this week .She recently had an occipital nerve block whichh was very helpful. \par Pt using Nurtec and Naratriptan ( not simultaneously ) with moderate effect. \par Denies any new symptoms.  [Headache] : headache [Nausea] : nausea [Photophobia] : photophobia [Phonophobia] : phonophobia

## 2021-01-11 NOTE — ASSESSMENT
[FreeTextEntry1] : no changes today. \par Continue Botox and occipital nerve blocks with Dr Garsia. \par RTO 3-4 months. \par \par Dr Hitchcock on site , billed incident to service.

## 2021-01-11 NOTE — REVIEW OF SYSTEMS
[Fever] : no fever [Chills] : no chills [Chest Pain] : no chest pain [Palpitations] : no palpitations [Abdominal Pain] : no abdominal pain [Dizziness] : no dizziness [Fainting] : no fainting

## 2021-01-11 NOTE — PHYSICAL EXAM
[General Appearance - Alert] : alert [General Appearance - Well-Appearing] : healthy appearing [Oriented To Time, Place, And Person] : oriented to person, place, and time [Affect] : the affect was normal [Mood] : the mood was normal [Cranial Nerves Accessory (XI - Cranial And Spinal)] : head turning and shoulder shrug symmetric [Motor Strength] : muscle strength was normal in all four extremities [Paresis Pronator Drift Right-Sided] : no pronator drift on the right [Paresis Pronator Drift Left-Sided] : no pronator drift on the left [Motor Strength Upper Extremities Bilaterally] : strength was normal in both upper extremities [Motor Strength Lower Extremities Bilaterally] : strength was normal in both lower extremities [Sclera] : the sclera and conjunctiva were normal [PERRL With Normal Accommodation] : pupils were equal in size, round, reactive to light, with normal accommodation [Extraocular Movements] : extraocular movements were intact [] : no respiratory distress [Respiration, Rhythm And Depth] : normal respiratory rhythm and effort [Edema] : there was no peripheral edema [Abnormal Walk] : normal gait

## 2021-04-15 ENCOUNTER — APPOINTMENT (OUTPATIENT)
Dept: PAIN MANAGEMENT | Facility: CLINIC | Age: 61
End: 2021-04-15
Payer: MEDICARE

## 2021-04-15 VITALS
WEIGHT: 139 LBS | DIASTOLIC BLOOD PRESSURE: 63 MMHG | BODY MASS INDEX: 23.86 KG/M2 | SYSTOLIC BLOOD PRESSURE: 103 MMHG | HEART RATE: 84 BPM

## 2021-04-15 VITALS — TEMPERATURE: 97.4 F

## 2021-04-15 PROCEDURE — 99214 OFFICE O/P EST MOD 30 MIN: CPT

## 2021-04-15 NOTE — PHYSICAL EXAM
[General Appearance - Alert] : alert [General Appearance - In No Acute Distress] : in no acute distress [General Appearance - Well Nourished] : well nourished [General Appearance - Well Developed] : well developed [General Appearance - Well-Appearing] : healthy appearing [Oriented To Time, Place, And Person] : oriented to person, place, and time [Impaired Insight] : insight and judgment were intact [Affect] : the affect was normal [Mood] : the mood was normal [Memory Recent] : recent memory was not impaired [Memory Remote] : remote memory was not impaired [Person] : oriented to person [Place] : oriented to place [Time] : oriented to time [Short Term Intact] : short term memory intact [Remote Intact] : remote memory intact [Registration Intact] : recent registration memory intact [Concentration Intact] : normal concentrating ability [Visual Intact] : visual attention was ~T not ~L decreased [Fluency] : fluency intact [Comprehension] : comprehension intact [Current Events] : adequate knowledge of current events [Past History] : adequate knowledge of personal past history [Vocabulary] : adequate range of vocabulary [Cranial Nerves Optic (II)] : visual acuity intact bilaterally,  visual fields full to confrontation, pupils equal round and reactive to light [Cranial Nerves Oculomotor (III)] : extraocular motion intact [Cranial Nerves Trigeminal (V)] : facial sensation intact symmetrically [Cranial Nerves Facial (VII)] : face symmetrical [Cranial Nerves Vestibulocochlear (VIII)] : hearing was intact bilaterally [Cranial Nerves Glossopharyngeal (IX)] : tongue and palate midline [Cranial Nerves Accessory (XI - Cranial And Spinal)] : head turning and shoulder shrug symmetric [Cranial Nerves Hypoglossal (XII)] : there was no tongue deviation with protrusion [Motor Tone] : muscle tone was normal in all four extremities [Motor Strength] : muscle strength was normal in all four extremities [No Muscle Atrophy] : normal bulk in all four extremities [Motor Handedness Right-Handed] : the patient is right hand dominant [Motor Strength Upper Extremities Bilaterally] : strength was normal in both upper extremities [Motor Strength Lower Extremities Bilaterally] : strength was normal in both lower extremities [Sensation Tactile Decrease] : light touch was intact [Allodynia] : ~T allodynia present [Balance] : balance was intact [Limited Balance] : balance was intact [Past-pointing] : there was no past-pointing [Dysdiadochokinesia Bilaterally] : not present [Neck Appearance] : the appearance of the neck was normal [FreeTextEntry1] : moderate decrease range of mtoin in paraspinals [Exaggerated Use Of Accessory Muscles For Inspiration] : no accessory muscle use [Edema] : there was no peripheral edema [Abnormal Walk] : normal gait [Nail Clubbing] : no clubbing  or cyanosis of the fingernails [Involuntary Movements] : no involuntary movements were seen [Musculoskeletal - Swelling] : no joint swelling seen [Skin Color & Pigmentation] : normal skin color and pigmentation [] : no rash

## 2021-04-15 NOTE — REVIEW OF SYSTEMS
[Fever] : no fever [Chills] : no chills [Feeling Poorly] : feeling poorly [Feeling Tired] : feeling tired [Eye Pain] : eye pain [Eyesight Problems] : no eyesight problems [Loss Of Hearing] : no hearing loss [Nasal Discharge] : no nasal discharge [Chest Pain] : no chest pain [Palpitations] : no palpitations [Shortness Of Breath] : no shortness of breath [Cough] : no cough [Constipation] : no constipation [Arthralgias] : arthralgias [Joint Pain] : joint pain [Lower Back Pain] : no lower back pain [Skin Lesions] : no skin lesions [Skin Wound] : no skin wound [Itching] : no itching [Convulsions] : no convulsions [Fainting] : no fainting [Sleep Disturbances] : sleep disturbances [Muscle Weakness] : no muscle weakness [Swollen Glands] : no swollen glands

## 2021-04-15 NOTE — ASSESSMENT
[FreeTextEntry1] : trial of ketorolac bridge\par trial of Vyepti\par continue botox for now.\par reduced stressors with family/ mothers health.

## 2021-04-15 NOTE — HISTORY OF PRESENT ILLNESS
[FreeTextEntry1] : Pt ntoes she had been having more frueqent headaches towards end of cycle.  Had 2nd Pfizer shot this month.\par Have avoided doing nerve block with steroids because of COVID.  \par Does have pain over the left side with droopiness and pain.\par Has been using prn nurtec (although no clear benefit on vertigo although it helps with the headache.)  alternates with nurtec and naratriptan.\par Had been increasing daytime gabapentin.\par Sees wearing of in 3rd month Botox.

## 2021-05-25 ENCOUNTER — TRANSCRIPTION ENCOUNTER (OUTPATIENT)
Age: 61
End: 2021-05-25

## 2021-06-10 ENCOUNTER — APPOINTMENT (OUTPATIENT)
Dept: NEUROLOGY | Facility: CLINIC | Age: 61
End: 2021-06-10
Payer: MEDICARE

## 2021-06-10 VITALS — RESPIRATION RATE: 18 BRPM | SYSTOLIC BLOOD PRESSURE: 138 MMHG | HEART RATE: 85 BPM | DIASTOLIC BLOOD PRESSURE: 86 MMHG

## 2021-06-10 PROCEDURE — 96365 THER/PROPH/DIAG IV INF INIT: CPT

## 2021-06-10 RX ORDER — EPTINEZUMAB-JJMR 100 MG/ML
100 INJECTION INTRAVENOUS
Refills: 0 | Status: COMPLETED | OUTPATIENT
Start: 2021-06-10

## 2021-06-10 RX ADMIN — EPTINEZUMAB-JJMR 0 MG/ML: 100 INJECTION INTRAVENOUS at 00:00

## 2021-07-15 ENCOUNTER — APPOINTMENT (OUTPATIENT)
Dept: PAIN MANAGEMENT | Facility: CLINIC | Age: 61
End: 2021-07-15
Payer: MEDICARE

## 2021-07-15 VITALS
DIASTOLIC BLOOD PRESSURE: 72 MMHG | HEART RATE: 79 BPM | HEIGHT: 64 IN | SYSTOLIC BLOOD PRESSURE: 115 MMHG | WEIGHT: 143 LBS | BODY MASS INDEX: 24.41 KG/M2

## 2021-07-15 PROCEDURE — 99212 OFFICE O/P EST SF 10 MIN: CPT

## 2021-07-15 NOTE — ASSESSMENT
[FreeTextEntry1] : No changes today on migraine medication .\par Plan on September Vyepti infusion.

## 2021-07-15 NOTE — HISTORY OF PRESENT ILLNESS
[FreeTextEntry1] : Pt returns today for a follow up visit. \par Had first infusion of Vyepti in  . Believes it  has helped  the severity of migraine . \par Pt is due for Botox injection next week. \par No new health concerns reported today.  [Headache] : headache [Nausea] : nausea [Photophobia] : photophobia [Phonophobia] : phonophobia [Daily] : daily

## 2021-07-26 ENCOUNTER — NON-APPOINTMENT (OUTPATIENT)
Age: 61
End: 2021-07-26

## 2021-08-11 ENCOUNTER — NON-APPOINTMENT (OUTPATIENT)
Age: 61
End: 2021-08-11

## 2021-08-20 ENCOUNTER — TRANSCRIPTION ENCOUNTER (OUTPATIENT)
Age: 61
End: 2021-08-20

## 2021-09-13 ENCOUNTER — APPOINTMENT (OUTPATIENT)
Dept: PAIN MANAGEMENT | Facility: CLINIC | Age: 61
End: 2021-09-13
Payer: MEDICARE

## 2021-09-13 VITALS
DIASTOLIC BLOOD PRESSURE: 76 MMHG | HEART RATE: 90 BPM | BODY MASS INDEX: 24.41 KG/M2 | HEIGHT: 64 IN | SYSTOLIC BLOOD PRESSURE: 118 MMHG | WEIGHT: 143 LBS

## 2021-09-13 PROCEDURE — 99214 OFFICE O/P EST MOD 30 MIN: CPT

## 2021-09-16 ENCOUNTER — APPOINTMENT (OUTPATIENT)
Dept: NEUROLOGY | Facility: CLINIC | Age: 61
End: 2021-09-16
Payer: MEDICARE

## 2021-09-16 VITALS — SYSTOLIC BLOOD PRESSURE: 121 MMHG | RESPIRATION RATE: 16 BRPM | DIASTOLIC BLOOD PRESSURE: 74 MMHG | HEART RATE: 84 BPM

## 2021-09-16 PROCEDURE — 96365 THER/PROPH/DIAG IV INF INIT: CPT

## 2021-09-16 RX ORDER — EPTINEZUMAB-JJMR 100 MG/ML
100 INJECTION INTRAVENOUS
Refills: 0 | Status: COMPLETED | OUTPATIENT
Start: 2021-09-16

## 2021-09-16 RX ADMIN — EPTINEZUMAB-JJMR 0 MG/ML: 100 INJECTION INTRAVENOUS at 00:00

## 2021-09-19 NOTE — PHYSICAL EXAM
[General Appearance - Alert] : alert [General Appearance - In No Acute Distress] : in no acute distress [General Appearance - Well Nourished] : well nourished [General Appearance - Well Developed] : well developed [General Appearance - Well-Appearing] : healthy appearing [Oriented To Time, Place, And Person] : oriented to person, place, and time [Impaired Insight] : insight and judgment were intact [Affect] : the affect was normal [Mood] : the mood was normal [Memory Recent] : recent memory was not impaired [Memory Remote] : remote memory was not impaired [Person] : oriented to person [Place] : oriented to place [Time] : oriented to time [Short Term Intact] : short term memory intact [Remote Intact] : remote memory intact [Registration Intact] : recent registration memory intact [Concentration Intact] : normal concentrating ability [Visual Intact] : visual attention was ~T not ~L decreased [Fluency] : fluency intact [Comprehension] : comprehension intact [Current Events] : adequate knowledge of current events [Past History] : adequate knowledge of personal past history [Vocabulary] : adequate range of vocabulary [Cranial Nerves Optic (II)] : visual acuity intact bilaterally,  visual fields full to confrontation, pupils equal round and reactive to light [Cranial Nerves Oculomotor (III)] : extraocular motion intact [Cranial Nerves Trigeminal (V)] : facial sensation intact symmetrically [Cranial Nerves Facial (VII)] : face symmetrical [Cranial Nerves Vestibulocochlear (VIII)] : hearing was intact bilaterally [Cranial Nerves Glossopharyngeal (IX)] : tongue and palate midline [Cranial Nerves Accessory (XI - Cranial And Spinal)] : head turning and shoulder shrug symmetric [Cranial Nerves Hypoglossal (XII)] : there was no tongue deviation with protrusion [Motor Tone] : muscle tone was normal in all four extremities [Motor Strength] : muscle strength was normal in all four extremities [No Muscle Atrophy] : normal bulk in all four extremities [Motor Handedness Right-Handed] : the patient is right hand dominant [Sensation Tactile Decrease] : light touch was intact [Allodynia] : ~T allodynia present [Balance] : balance was intact [Neck Appearance] : the appearance of the neck was normal [Exaggerated Use Of Accessory Muscles For Inspiration] : no accessory muscle use [Edema] : there was no peripheral edema [Abnormal Walk] : normal gait [Nail Clubbing] : no clubbing  or cyanosis of the fingernails [Involuntary Movements] : no involuntary movements were seen [Musculoskeletal - Swelling] : no joint swelling seen [Skin Color & Pigmentation] : normal skin color and pigmentation [] : no rash [Motor Strength Upper Extremities Bilaterally] : strength was normal in both upper extremities [Motor Strength Lower Extremities Bilaterally] : strength was normal in both lower extremities [Limited Balance] : balance was intact [Past-pointing] : there was no past-pointing [Dysdiadochokinesia Bilaterally] : not present [FreeTextEntry1] : moderate decrease range of mtoin in paraspinals

## 2021-09-19 NOTE — HISTORY OF PRESENT ILLNESS
[FreeTextEntry1] : Pt under great stress with her mothers health.  Poor sleep and worsening headaches and mood.  \par Feels pain into eyes and feels overall fatigued.  NO other new focal neurological deficits.\par Is pending botox on Oct 21st.\par Thinking about repeating ONB given partial response and associated occipital pain.\par Does feel moderate benefit from Botox [Chronic Headache] : chronic headache [Nausea] : nausea [Photophobia] : photophobia [Phonophobia] : phonophobia [Neck Pain] : neck pain [Scalp Tenderness] : scalp tenderness [> 15 days per month] : > 15 days per month [> 4 hours] : > 4 hours [stayed the same] : stayed the same [Stable] : The patient reports ~his/her~ symptoms since the last visit are stable

## 2021-09-19 NOTE — ASSESSMENT
[FreeTextEntry1] : would plan for repeat vyepti 100mg\par to repeat botox\par increase gabapentin to 200mg tid or 300mg tid

## 2021-09-19 NOTE — REVIEW OF SYSTEMS
[Fever] : no fever [Chills] : no chills [Feeling Poorly] : feeling poorly [Feeling Tired] : feeling tired [Eye Pain] : eye pain [Eyesight Problems] : no eyesight problems [Nasal Discharge] : no nasal discharge [Chest Pain] : no chest pain [Palpitations] : no palpitations [Cough] : no cough [Arthralgias] : arthralgias [Neck Pain] : neck pain [Skin Lesions] : no skin lesions [Itching] : no itching [Convulsions] : no convulsions [Fainting] : no fainting [As Noted in HPI] : as noted in HPI [Sleep Disturbances] : sleep disturbances [Muscle Weakness] : no muscle weakness [Swollen Glands] : no swollen glands

## 2021-11-11 ENCOUNTER — TRANSCRIPTION ENCOUNTER (OUTPATIENT)
Age: 61
End: 2021-11-11

## 2021-12-13 ENCOUNTER — APPOINTMENT (OUTPATIENT)
Dept: NEUROLOGY | Facility: CLINIC | Age: 61
End: 2021-12-13
Payer: MEDICARE

## 2021-12-13 ENCOUNTER — APPOINTMENT (OUTPATIENT)
Dept: PAIN MANAGEMENT | Facility: CLINIC | Age: 61
End: 2021-12-13
Payer: MEDICARE

## 2021-12-13 VITALS — HEART RATE: 78 BPM | SYSTOLIC BLOOD PRESSURE: 114 MMHG | DIASTOLIC BLOOD PRESSURE: 64 MMHG | RESPIRATION RATE: 18 BRPM

## 2021-12-13 PROCEDURE — 99213 OFFICE O/P EST LOW 20 MIN: CPT | Mod: 25

## 2021-12-13 PROCEDURE — 96365 THER/PROPH/DIAG IV INF INIT: CPT

## 2021-12-13 RX ORDER — EVOLOCUMAB 140 MG/ML
140 INJECTION, SOLUTION SUBCUTANEOUS
Refills: 0 | Status: ACTIVE | COMMUNITY
Start: 2021-12-13

## 2021-12-13 NOTE — HISTORY OF PRESENT ILLNESS
[Headache] : headache [Nausea] : nausea [Photophobia] : photophobia [Phonophobia] : phonophobia [FreeTextEntry1] : Pt having ONB today at Dr Garsia's office. Is scheduled for Botox at the end of January. REceiving Vyepti 100mg q 3 month. Tolerates current medications well. \par Migraine frequency ~2-3x/ week. \par \par Had a recent kidney stone - went to ED.

## 2021-12-13 NOTE — PHYSICAL EXAM
[General Appearance - Alert] : alert [General Appearance - In No Acute Distress] : in no acute distress [General Appearance - Well Nourished] : well nourished [General Appearance - Well Developed] : well developed [General Appearance - Well-Appearing] : healthy appearing [Oriented To Time, Place, And Person] : oriented to person, place, and time [Impaired Insight] : insight and judgment were intact [Affect] : the affect was normal [Mood] : the mood was normal [Memory Recent] : recent memory was not impaired [Memory Remote] : remote memory was not impaired [Person] : oriented to person [Place] : oriented to place [Time] : oriented to time [Short Term Intact] : short term memory intact [Remote Intact] : remote memory intact [Registration Intact] : recent registration memory intact [Concentration Intact] : normal concentrating ability [Visual Intact] : visual attention was ~T not ~L decreased [Fluency] : fluency intact [Comprehension] : comprehension intact [Current Events] : adequate knowledge of current events [Past History] : adequate knowledge of personal past history [Vocabulary] : adequate range of vocabulary [Cranial Nerves Optic (II)] : visual acuity intact bilaterally,  visual fields full to confrontation, pupils equal round and reactive to light [Cranial Nerves Oculomotor (III)] : extraocular motion intact [Cranial Nerves Trigeminal (V)] : facial sensation intact symmetrically [Cranial Nerves Facial (VII)] : face symmetrical [Cranial Nerves Vestibulocochlear (VIII)] : hearing was intact bilaterally [Cranial Nerves Glossopharyngeal (IX)] : tongue and palate midline [Cranial Nerves Accessory (XI - Cranial And Spinal)] : head turning and shoulder shrug symmetric [Cranial Nerves Hypoglossal (XII)] : there was no tongue deviation with protrusion [Motor Tone] : muscle tone was normal in all four extremities [Motor Strength] : muscle strength was normal in all four extremities [No Muscle Atrophy] : normal bulk in all four extremities [Motor Handedness Right-Handed] : the patient is right hand dominant [Motor Strength Upper Extremities Bilaterally] : strength was normal in both upper extremities [Motor Strength Lower Extremities Bilaterally] : strength was normal in both lower extremities [Sensation Tactile Decrease] : light touch was intact [Allodynia] : ~T allodynia present [Balance] : balance was intact [Limited Balance] : balance was intact [Past-pointing] : there was no past-pointing [Dysdiadochokinesia Bilaterally] : not present [Sclera] : the sclera and conjunctiva were normal [PERRL With Normal Accommodation] : pupils were equal in size, round, reactive to light, with normal accommodation [Extraocular Movements] : extraocular movements were intact [Neck Appearance] : the appearance of the neck was normal [FreeTextEntry1] : moderate decrease range of mtoin in paraspinals [Exaggerated Use Of Accessory Muscles For Inspiration] : no accessory muscle use [Edema] : there was no peripheral edema [Abnormal Walk] : normal gait [Nail Clubbing] : no clubbing  or cyanosis of the fingernails [Involuntary Movements] : no involuntary movements were seen [Musculoskeletal - Swelling] : no joint swelling seen [Skin Color & Pigmentation] : normal skin color and pigmentation [] : no rash

## 2021-12-13 NOTE — ASSESSMENT
[FreeTextEntry1] : No changes today . \par RTO 3 months\par  Dr Hitchcock on site , billed incident to service.

## 2022-03-07 ENCOUNTER — APPOINTMENT (OUTPATIENT)
Dept: NEUROLOGY | Facility: CLINIC | Age: 62
End: 2022-03-07

## 2022-05-02 ENCOUNTER — APPOINTMENT (OUTPATIENT)
Dept: PAIN MANAGEMENT | Facility: CLINIC | Age: 62
End: 2022-05-02
Payer: MEDICARE

## 2022-05-02 VITALS
DIASTOLIC BLOOD PRESSURE: 70 MMHG | WEIGHT: 143 LBS | BODY MASS INDEX: 24.41 KG/M2 | HEIGHT: 64 IN | SYSTOLIC BLOOD PRESSURE: 106 MMHG | HEART RATE: 84 BPM

## 2022-05-02 PROCEDURE — 64615 CHEMODENERV MUSC MIGRAINE: CPT

## 2022-05-02 PROCEDURE — 99213 OFFICE O/P EST LOW 20 MIN: CPT | Mod: 25

## 2022-05-08 NOTE — PHYSICAL EXAM
[General Appearance - Alert] : alert [General Appearance - In No Acute Distress] : in no acute distress [General Appearance - Well Nourished] : well nourished [General Appearance - Well Developed] : well developed [General Appearance - Well-Appearing] : healthy appearing [Oriented To Time, Place, And Person] : oriented to person, place, and time [Impaired Insight] : insight and judgment were intact [Mood] : the mood was normal [Affect] : the affect was normal [Memory Recent] : recent memory was not impaired [Memory Remote] : remote memory was not impaired [Person] : oriented to person [Place] : oriented to place [Time] : oriented to time [Short Term Intact] : short term memory intact [Remote Intact] : remote memory intact [Registration Intact] : recent registration memory intact [Concentration Intact] : normal concentrating ability [Visual Intact] : visual attention was ~T not ~L decreased [Fluency] : fluency intact [Comprehension] : comprehension intact [Current Events] : adequate knowledge of current events [Past History] : adequate knowledge of personal past history [Vocabulary] : adequate range of vocabulary [Cranial Nerves Optic (II)] : visual acuity intact bilaterally,  visual fields full to confrontation, pupils equal round and reactive to light [Cranial Nerves Oculomotor (III)] : extraocular motion intact [Cranial Nerves Trigeminal (V)] : facial sensation intact symmetrically [Cranial Nerves Facial (VII)] : face symmetrical [Cranial Nerves Vestibulocochlear (VIII)] : hearing was intact bilaterally [Cranial Nerves Glossopharyngeal (IX)] : tongue and palate midline [Cranial Nerves Accessory (XI - Cranial And Spinal)] : head turning and shoulder shrug symmetric [Cranial Nerves Hypoglossal (XII)] : there was no tongue deviation with protrusion [Motor Tone] : muscle tone was normal in all four extremities [Motor Strength] : muscle strength was normal in all four extremities [No Muscle Atrophy] : normal bulk in all four extremities [Motor Handedness Right-Handed] : the patient is right hand dominant [Sensation Tactile Decrease] : light touch was intact [Allodynia] : ~T allodynia present [Balance] : balance was intact [Neck Appearance] : the appearance of the neck was normal [Exaggerated Use Of Accessory Muscles For Inspiration] : no accessory muscle use [Edema] : there was no peripheral edema [Abnormal Walk] : normal gait [Nail Clubbing] : no clubbing  or cyanosis of the fingernails [Involuntary Movements] : no involuntary movements were seen [Musculoskeletal - Swelling] : no joint swelling seen [Skin Color & Pigmentation] : normal skin color and pigmentation [] : no rash [Motor Strength Upper Extremities Bilaterally] : strength was normal in both upper extremities [Motor Strength Lower Extremities Bilaterally] : strength was normal in both lower extremities [Limited Balance] : balance was intact [Past-pointing] : there was no past-pointing [Dysdiadochokinesia Bilaterally] : not present [FreeTextEntry1] : moderate decrease range of mtoin in paraspinals

## 2022-05-08 NOTE — ASSESSMENT
[FreeTextEntry1] : Continue with Botox\par continue other meds without change for now\par consider use of increased dose of daily oral cgrp antagonist Qulipta to 60mg

## 2022-05-08 NOTE — REVIEW OF SYSTEMS
[Eye Pain] : eye pain [Fever] : no fever [Chills] : no chills [Convulsions] : no convulsions [Fainting] : no fainting [Sleep Disturbances] : sleep disturbances [Muscle Weakness] : no muscle weakness

## 2022-05-08 NOTE — HISTORY OF PRESENT ILLNESS
[FreeTextEntry1] : Pt rtc and notes that she continues to struggle with her migraines.\par NO change in quality or associated features\par Still with disabling events.\par Had not been back since last visit with [facility] in December for treatment.\par COntinues with current meds.\par No other evidence of acute infection and is anticipating Botox to be done today.\par  [Chronic Headache] : chronic headache [Nausea] : nausea [Photophobia] : photophobia [Phonophobia] : phonophobia [Neck Pain] : neck pain [> 15 days per month] : > 15 days per month [Stable] : The patient reports ~his/her~ symptoms since the last visit are stable

## 2022-05-10 ENCOUNTER — TRANSCRIPTION ENCOUNTER (OUTPATIENT)
Age: 62
End: 2022-05-10

## 2022-06-13 ENCOUNTER — APPOINTMENT (OUTPATIENT)
Dept: PAIN MANAGEMENT | Facility: CLINIC | Age: 62
End: 2022-06-13
Payer: MEDICARE

## 2022-06-13 VITALS
DIASTOLIC BLOOD PRESSURE: 79 MMHG | HEIGHT: 64 IN | BODY MASS INDEX: 24.41 KG/M2 | SYSTOLIC BLOOD PRESSURE: 121 MMHG | HEART RATE: 77 BPM | WEIGHT: 143 LBS

## 2022-06-13 PROCEDURE — 99213 OFFICE O/P EST LOW 20 MIN: CPT

## 2022-06-13 RX ORDER — EPTINEZUMAB-JJMR 100 MG/ML
100 INJECTION INTRAVENOUS
Qty: 100 | Refills: 3 | Status: DISCONTINUED | COMMUNITY
Start: 2021-04-15 | End: 2022-06-13

## 2022-06-13 NOTE — HISTORY OF PRESENT ILLNESS
[FreeTextEntry1] : Pt returns today for a follow up appt . \par Continues to have frequent migraine - daily.\par Does have events that are disabling .\par Tolerates current meds well. \par Has increased stress - caring for her elderly mother.\par No new symptoms. \par Pt has an appt with Dr Lopez for possible OCN blocks. \par  [Headache] : headache [Dizziness] : dizziness [Nausea] : nausea [Photophobia] : photophobia [Phonophobia] : phonophobia [Neck Pain] : neck pain [Daily] : daily

## 2022-06-13 NOTE — REVIEW OF SYSTEMS
[Fever] : no fever [Chills] : no chills [Eye Pain] : eye pain [Convulsions] : no convulsions [Fainting] : no fainting [Sleep Disturbances] : sleep disturbances [Muscle Weakness] : no muscle weakness

## 2022-06-13 NOTE — ASSESSMENT
[FreeTextEntry1] : Increase Qulipta to 60mg / day .\par Pt will be due for Botox in August.\par \par Dr Hitchcock onsite , billed incident to service.

## 2022-06-14 ENCOUNTER — APPOINTMENT (OUTPATIENT)
Dept: PHYSICAL MEDICINE AND REHAB | Facility: CLINIC | Age: 62
End: 2022-06-14

## 2022-06-14 ENCOUNTER — NON-APPOINTMENT (OUTPATIENT)
Age: 62
End: 2022-06-14

## 2022-06-14 VITALS
HEART RATE: 75 BPM | TEMPERATURE: 96.6 F | DIASTOLIC BLOOD PRESSURE: 82 MMHG | SYSTOLIC BLOOD PRESSURE: 121 MMHG | OXYGEN SATURATION: 97 %

## 2022-06-14 PROCEDURE — 99203 OFFICE O/P NEW LOW 30 MIN: CPT | Mod: 25

## 2022-06-14 PROCEDURE — 64405 NJX AA&/STRD GR OCPL NRV: CPT

## 2022-06-14 NOTE — REVIEW OF SYSTEMS
[Headache] : headache [Negative] : Heme/Lymph [Shortness Of Breath] : shortness of breath [FreeTextEntry6] : history of asthma

## 2022-06-14 NOTE — HISTORY OF PRESENT ILLNESS
[Other: ___] : [unfilled] [FreeTextEntry1] : Referred by Dr. Hitchcock and [facility] NP [___ yrs] : [unfilled] year(s) ago [4] : a current pain level of 4/10 [10] : a maximum pain level of 10/10 [Turning Head] : turning head [Chiropractor] : chiropractor [Acupuncture] : acupuncture [Medications] : medications [Injections] : injections [de-identified] : "crushing" [FreeTextEntry4] : medications [FreeTextEntry6] : multiple medications for migraines, occipital nerve blocks in the past which helped

## 2022-06-14 NOTE — ASSESSMENT
[FreeTextEntry1] : 62 year old female with headache, neck pain, and occipital neuralgia.  She did tolerate occipital nerve block without complaint or complication. She will return to see me for further injections as necessary.

## 2022-06-14 NOTE — CONSULT LETTER
[Dear  ___] : Dear ~LORETTA, [Consult Letter:] : I had the pleasure of evaluating your patient, [unfilled]. [Please see my note below.] : Please see my note below. [Consult Closing:] : Thank you very much for allowing me to participate in the care of this patient.  If you have any questions, please do not hesitate to contact me. [Sincerely,] : Sincerely, [FreeTextEntry2] : Bertin Hitchcock MD\par [address]\[place] [address] [FreeTextEntry3] : Lul

## 2022-06-14 NOTE — PROCEDURE
[de-identified] : Patient agreed to proceed with occipital nerve block.  Patient was in the seated position and the left occipital region was palpated and cleansed with alcohol in sterile fashion.  Using a 27 gauge 1.25 inch needle, a total of 8cc of 0.25% Bupivicaine was injected in a fan-like distribution after negative aspiration.  The needle was removed and hemostasis was achieved.  The patient reported no complications following the procedure.\par \par

## 2022-06-16 ENCOUNTER — TRANSCRIPTION ENCOUNTER (OUTPATIENT)
Age: 62
End: 2022-06-16

## 2022-06-28 ENCOUNTER — APPOINTMENT (OUTPATIENT)
Dept: PHYSICAL MEDICINE AND REHAB | Facility: CLINIC | Age: 62
End: 2022-06-28

## 2022-06-28 VITALS
DIASTOLIC BLOOD PRESSURE: 74 MMHG | TEMPERATURE: 96.8 F | OXYGEN SATURATION: 97 % | HEART RATE: 75 BPM | SYSTOLIC BLOOD PRESSURE: 123 MMHG

## 2022-06-28 DIAGNOSIS — G44.40 DRUG-INDUCED HEADACHE, NOT ELSEWHERE CLASSIFIED, NOT INTRACTABLE: ICD-10-CM

## 2022-06-28 PROCEDURE — 99213 OFFICE O/P EST LOW 20 MIN: CPT | Mod: 25

## 2022-06-28 PROCEDURE — 64405 NJX AA&/STRD GR OCPL NRV: CPT

## 2022-06-28 NOTE — PROCEDURE
[de-identified] : The right occipital region was palpated and cleansed with alcohol in sterile fashion.  Using a 27 gauge 1.5 inch needle, a total of 8cc of 1% Lidocaine was injected in a fan-like distribution after negative aspiration.  The needle was removed and hemostasis was achieved.

## 2022-06-28 NOTE — HISTORY OF PRESENT ILLNESS
[FreeTextEntry1] : Patient returns after being seen a couple of weeks ago.  She states that her left sided occipital nerve block was very helpful.  She returns today for the right side.

## 2022-06-28 NOTE — ASSESSMENT
[FreeTextEntry1] : 62 year old female with occipital neuralgia.  She did tolerate right occipital nerve block without complaint or complication.  She will return to see me at the earliest sign that her pain returns for further injection therapy as necessary.

## 2022-07-23 ENCOUNTER — NON-APPOINTMENT (OUTPATIENT)
Age: 62
End: 2022-07-23

## 2022-08-03 ENCOUNTER — APPOINTMENT (OUTPATIENT)
Dept: PAIN MANAGEMENT | Facility: CLINIC | Age: 62
End: 2022-08-03

## 2022-08-03 VITALS
HEART RATE: 83 BPM | BODY MASS INDEX: 23.05 KG/M2 | WEIGHT: 135 LBS | HEIGHT: 64 IN | SYSTOLIC BLOOD PRESSURE: 119 MMHG | DIASTOLIC BLOOD PRESSURE: 72 MMHG

## 2022-08-03 PROCEDURE — 99215 OFFICE O/P EST HI 40 MIN: CPT | Mod: 25

## 2022-08-03 PROCEDURE — 64615 CHEMODENERV MUSC MIGRAINE: CPT

## 2022-08-03 NOTE — HISTORY OF PRESENT ILLNESS
[FreeTextEntry1] : Pt is here today for Botox injections for migraine prevention.\par Pt benefits from Botox injections. Does experience a wear off effect with an increase in migraine frequency the last 2-3 weeks before the next injections.\par Discussed potential adverse effects of Botox injections including bleeding , bruising , infection , eye lidid , eye brow ptosis. Pt advised to avoid laying flat for 4 hours , do not exercise strenuously today and do not apply make- up or lotion to injection site. Pt verbalized understanding and wishes to proceed.

## 2022-08-03 NOTE — ASSESSMENT
[FreeTextEntry1] : Continue with Botox\par Qulipta 60mg causes fatigue and diarrhea . Decrease qulipta to 30mg / day . \par RTO 6 weeks for a follow up.

## 2022-08-24 ENCOUNTER — APPOINTMENT (OUTPATIENT)
Dept: PAIN MANAGEMENT | Facility: CLINIC | Age: 62
End: 2022-08-24

## 2022-10-06 ENCOUNTER — APPOINTMENT (OUTPATIENT)
Dept: PAIN MANAGEMENT | Facility: CLINIC | Age: 62
End: 2022-10-06

## 2022-10-06 VITALS
DIASTOLIC BLOOD PRESSURE: 75 MMHG | TEMPERATURE: 98.7 F | BODY MASS INDEX: 22.2 KG/M2 | HEIGHT: 64 IN | HEART RATE: 86 BPM | WEIGHT: 130 LBS | SYSTOLIC BLOOD PRESSURE: 117 MMHG

## 2022-10-06 PROCEDURE — 99213 OFFICE O/P EST LOW 20 MIN: CPT

## 2022-10-06 NOTE — HISTORY OF PRESENT ILLNESS
[FreeTextEntry1] : Patient here today for a follow up from Botox injections done on 8/3/22. \par Pt denies any adverse reactions, denies bleeding , bruising , ptosis.\par Pt has also decrease Qulipta  to 30mg , has had less GI side effects on this dose . \par Has had some sinus infections and dizziness. Otolaryngologist has ordered MRI of sinuses and brain.

## 2022-10-07 ENCOUNTER — APPOINTMENT (OUTPATIENT)
Dept: OTOLARYNGOLOGY | Facility: CLINIC | Age: 62
End: 2022-10-07

## 2022-10-11 ENCOUNTER — TRANSCRIPTION ENCOUNTER (OUTPATIENT)
Age: 62
End: 2022-10-11

## 2022-11-01 ENCOUNTER — APPOINTMENT (OUTPATIENT)
Dept: PHYSICAL MEDICINE AND REHAB | Facility: CLINIC | Age: 62
End: 2022-11-01

## 2022-11-01 VITALS
OXYGEN SATURATION: 95 % | DIASTOLIC BLOOD PRESSURE: 78 MMHG | HEART RATE: 68 BPM | SYSTOLIC BLOOD PRESSURE: 121 MMHG | TEMPERATURE: 97.2 F

## 2022-11-01 PROCEDURE — 64405 NJX AA&/STRD GR OCPL NRV: CPT

## 2022-11-01 PROCEDURE — 99213 OFFICE O/P EST LOW 20 MIN: CPT | Mod: 25

## 2022-11-01 NOTE — ASSESSMENT
[FreeTextEntry1] : 62 year old female occipital neuralgia.  She did tolerate left occipital nerve block without complaint or complication.  She will return to see me in 1-2 weeks for her right occipital nerve block.

## 2022-11-01 NOTE — HISTORY OF PRESENT ILLNESS
[FreeTextEntry1] : Patient returns after being seen in June.  She did have occipital nerve blocks and was feeling well for about 4 months.  She is interested in a left occipital nerve block today.

## 2022-11-01 NOTE — REASON FOR VISIT
[Follow-Up] : a follow-up visit
,[email or web address]

## 2022-11-01 NOTE — PROCEDURE
[de-identified] : Left occipital region was palpated and marked.  The area was cleansed with alcohol in sterile fashion.  Using a 27 gauge 1.25 inch needle, a total of 9cc of 0.25% Bupivicaine was injected in a fan-like distribution after negative aspiration.  The needle was removed and hemostasis was achieved.

## 2022-11-03 ENCOUNTER — APPOINTMENT (OUTPATIENT)
Dept: PAIN MANAGEMENT | Facility: CLINIC | Age: 62
End: 2022-11-03

## 2022-11-03 VITALS
HEART RATE: 93 BPM | BODY MASS INDEX: 22.53 KG/M2 | HEIGHT: 64 IN | DIASTOLIC BLOOD PRESSURE: 77 MMHG | SYSTOLIC BLOOD PRESSURE: 124 MMHG | WEIGHT: 132 LBS

## 2022-11-03 PROCEDURE — 99215 OFFICE O/P EST HI 40 MIN: CPT | Mod: 25

## 2022-11-03 PROCEDURE — 64615 CHEMODENERV MUSC MIGRAINE: CPT

## 2022-11-03 NOTE — HISTORY OF PRESENT ILLNESS
[FreeTextEntry1] : Pt is here today for Botox injections for migraine prevention.\par Pt benefits from Botox injections. Does experience a wear off effect with an increase in migraine frequency the last 2-3 weeks before the next injections.\par Discussed potential adverse effects of Botox injections including bleeding , bruising , infection , eye lidid , eye brow ptosis. Pt advised to avoid laying flat for 4 hours , do not exercise strenuously today and do not apply make- up or lotion to injection site. Pt verbalized understanding and wishes to proceed. \par Last Botox injections were 8/3/22.

## 2022-11-15 ENCOUNTER — APPOINTMENT (OUTPATIENT)
Dept: PHYSICAL MEDICINE AND REHAB | Facility: CLINIC | Age: 62
End: 2022-11-15

## 2022-11-15 PROCEDURE — 64405 NJX AA&/STRD GR OCPL NRV: CPT

## 2022-11-15 PROCEDURE — 99213 OFFICE O/P EST LOW 20 MIN: CPT | Mod: 25

## 2022-11-15 NOTE — PROCEDURE
[de-identified] : Right occipital region was palpated and cleansed with alcohol in sterile fashion.  Using a 25 gauge 1.25 inch needle, at total of 8cc of 0.25% Bupivicaine was injected in a fan-like distribution after negative aspiration.  The needle was removed and hemostasis was achieved.

## 2022-11-15 NOTE — HISTORY OF PRESENT ILLNESS
[FreeTextEntry1] : Patient returns after her left occipital nerve block 2 weeks ago.  She feels 50% improved as compared to prior to her procedure.  She is here today for right sided occipital nerve block.

## 2022-11-15 NOTE — ASSESSMENT
[FreeTextEntry1] : 62 year old female with occipital neuralgia now improved following her left-sided injection.  Today, she tolerated right occipital nerve block without complaint or complication.  She will return to see me in 2 months if necessary for repeat injection.

## 2022-11-21 ENCOUNTER — NON-APPOINTMENT (OUTPATIENT)
Age: 62
End: 2022-11-21

## 2023-01-03 ENCOUNTER — TRANSCRIPTION ENCOUNTER (OUTPATIENT)
Age: 63
End: 2023-01-03

## 2023-01-24 ENCOUNTER — APPOINTMENT (OUTPATIENT)
Dept: PHYSICAL MEDICINE AND REHAB | Facility: CLINIC | Age: 63
End: 2023-01-24
Payer: MEDICARE

## 2023-01-24 VITALS
TEMPERATURE: 96.3 F | OXYGEN SATURATION: 95 % | HEART RATE: 91 BPM | SYSTOLIC BLOOD PRESSURE: 127 MMHG | DIASTOLIC BLOOD PRESSURE: 84 MMHG

## 2023-01-24 PROCEDURE — 99213 OFFICE O/P EST LOW 20 MIN: CPT | Mod: 25

## 2023-01-24 PROCEDURE — 64405 NJX AA&/STRD GR OCPL NRV: CPT

## 2023-01-24 NOTE — ASSESSMENT
[FreeTextEntry1] : 62 year old female with occipital neuralgia.  She did tolerate right occipital nerve block without complaint or complication.  She will return to see me in 2 weeks for left occipital nerve block.

## 2023-01-24 NOTE — HISTORY OF PRESENT ILLNESS
[FreeTextEntry1] : Patient returns after being seen in November.  She had a right occipital nerve block which helped her significantly.  She states that she is here for another right occipital nerve block.

## 2023-01-24 NOTE — PROCEDURE
[de-identified] : The right occipital region was palpated and cleansed with alcohol in sterile fashion.  Using a 25-gauge 1.5 inch needle, a total of 8cc of 0.25% Bupivicaine was injected in a fan-like distribution after negative aspiration.  The needle was removed and hemostasis was achieved.

## 2023-02-03 ENCOUNTER — APPOINTMENT (OUTPATIENT)
Dept: PAIN MANAGEMENT | Facility: CLINIC | Age: 63
End: 2023-02-03
Payer: MEDICARE

## 2023-02-03 VITALS
HEART RATE: 83 BPM | HEIGHT: 64 IN | SYSTOLIC BLOOD PRESSURE: 137 MMHG | WEIGHT: 130 LBS | RESPIRATION RATE: 16 BRPM | DIASTOLIC BLOOD PRESSURE: 92 MMHG | BODY MASS INDEX: 22.2 KG/M2

## 2023-02-03 PROCEDURE — 99215 OFFICE O/P EST HI 40 MIN: CPT | Mod: 25

## 2023-02-03 PROCEDURE — 64615 CHEMODENERV MUSC MIGRAINE: CPT

## 2023-02-03 NOTE — PHYSICAL EXAM
[General Appearance - Alert] : alert [General Appearance - In No Acute Distress] : in no acute distress [General Appearance - Well Nourished] : well nourished [General Appearance - Well Developed] : well developed [General Appearance - Well-Appearing] : healthy appearing [Oriented To Time, Place, And Person] : oriented to person, place, and time [Impaired Insight] : insight and judgment were intact [Affect] : the affect was normal [Mood] : the mood was normal [Memory Recent] : recent memory was not impaired [Memory Remote] : remote memory was not impaired [Person] : oriented to person [Place] : oriented to place [Time] : oriented to time [Short Term Intact] : short term memory intact [Remote Intact] : remote memory intact [Registration Intact] : recent registration memory intact [Concentration Intact] : normal concentrating ability [Visual Intact] : visual attention was ~T not ~L decreased [Fluency] : fluency intact [Comprehension] : comprehension intact [Current Events] : adequate knowledge of current events [Past History] : adequate knowledge of personal past history [Vocabulary] : adequate range of vocabulary [Cranial Nerves Optic (II)] : visual acuity intact bilaterally,  visual fields full to confrontation, pupils equal round and reactive to light [Cranial Nerves Oculomotor (III)] : extraocular motion intact [Cranial Nerves Trigeminal (V)] : facial sensation intact symmetrically [Cranial Nerves Facial (VII)] : face symmetrical [Cranial Nerves Vestibulocochlear (VIII)] : hearing was intact bilaterally [Cranial Nerves Glossopharyngeal (IX)] : tongue and palate midline [Cranial Nerves Accessory (XI - Cranial And Spinal)] : head turning and shoulder shrug symmetric [Cranial Nerves Hypoglossal (XII)] : there was no tongue deviation with protrusion [Motor Tone] : muscle tone was normal in all four extremities [Motor Strength] : muscle strength was normal in all four extremities [No Muscle Atrophy] : normal bulk in all four extremities [Motor Handedness Right-Handed] : the patient is right hand dominant [Motor Strength Upper Extremities Bilaterally] : strength was normal in both upper extremities [Motor Strength Lower Extremities Bilaterally] : strength was normal in both lower extremities [Sensation Tactile Decrease] : light touch was intact [Allodynia] : ~T allodynia present [Balance] : balance was intact [Limited Balance] : balance was intact [Past-pointing] : there was no past-pointing [Dysdiadochokinesia Bilaterally] : not present [Neck Appearance] : the appearance of the neck was normal [FreeTextEntry1] : moderate decrease range of mtoin in paraspinals [Exaggerated Use Of Accessory Muscles For Inspiration] : no accessory muscle use [Edema] : there was no peripheral edema [Abnormal Walk] : normal gait [Nail Clubbing] : no clubbing  or cyanosis of the fingernails [Musculoskeletal - Swelling] : no joint swelling seen [Involuntary Movements] : no involuntary movements were seen [Skin Color & Pigmentation] : normal skin color and pigmentation [] : no rash

## 2023-02-03 NOTE — HISTORY OF PRESENT ILLNESS
[FreeTextEntry1] : Pt is here today for Botox injections for migraine prevention.\par Pt benefits from Botox injections. Does experience a wear off effect with an increase in migraine frequency the last 2-3 weeks before the next injections.\par Discussed potential adverse effects of Botox injections including bleeding , bruising , infection , eye lidid , eye brow ptosis. Pt advised to avoid laying flat for 4 hours , do not exercise strenuously today and do not apply make- up or lotion to injection site. Pt verbalized understanding and wishes to proceed. \par Last Botox injections were11/3/22.

## 2023-02-07 ENCOUNTER — APPOINTMENT (OUTPATIENT)
Dept: PHYSICAL MEDICINE AND REHAB | Facility: CLINIC | Age: 63
End: 2023-02-07
Payer: MEDICARE

## 2023-02-07 VITALS
SYSTOLIC BLOOD PRESSURE: 144 MMHG | OXYGEN SATURATION: 96 % | HEART RATE: 77 BPM | DIASTOLIC BLOOD PRESSURE: 85 MMHG | TEMPERATURE: 96.7 F

## 2023-02-07 PROCEDURE — 99213 OFFICE O/P EST LOW 20 MIN: CPT | Mod: 25

## 2023-02-07 PROCEDURE — 64405 NJX AA&/STRD GR OCPL NRV: CPT

## 2023-02-07 NOTE — PROCEDURE
[de-identified] : Left occipital region was palpated and cleansed with alcohol in sterile fashion.  Using a 27 gauge 1.5 inch needle, a total of 8cc of 0.25% Bupivicaine was injected in a fan-like distribution after negative aspiration.  The needle was removed as hemostasis was achieved.

## 2023-02-07 NOTE — HISTORY OF PRESENT ILLNESS
[FreeTextEntry1] : Patient returns after being seen a couple of weeks ago.  She did have a right occipital nerve block.  She returns today for left occipital nerve block.

## 2023-02-07 NOTE — ASSESSMENT
[FreeTextEntry1] : 62 year old female with occipital neuralgia.  She did tolerate left occipital nerve block without complaint or complication.  She will follow up with me at the earliest sign that her pain returns for further injection therapy as necessary.

## 2023-03-02 ENCOUNTER — TRANSCRIPTION ENCOUNTER (OUTPATIENT)
Age: 63
End: 2023-03-02

## 2023-03-02 RX ORDER — NARATRIPTAN 2.5 MG/1
2.5 TABLET, FILM COATED ORAL
Qty: 8 | Refills: 2 | Status: ACTIVE | COMMUNITY
Start: 2018-05-09 | End: 1900-01-01

## 2023-03-11 ENCOUNTER — NON-APPOINTMENT (OUTPATIENT)
Age: 63
End: 2023-03-11

## 2023-03-21 ENCOUNTER — APPOINTMENT (OUTPATIENT)
Dept: OTOLARYNGOLOGY | Facility: CLINIC | Age: 63
End: 2023-03-21
Payer: MEDICARE

## 2023-03-21 VITALS
SYSTOLIC BLOOD PRESSURE: 112 MMHG | HEIGHT: 64 IN | BODY MASS INDEX: 23.9 KG/M2 | HEART RATE: 76 BPM | WEIGHT: 140 LBS | DIASTOLIC BLOOD PRESSURE: 75 MMHG

## 2023-03-21 PROCEDURE — 31231 NASAL ENDOSCOPY DX: CPT

## 2023-03-21 PROCEDURE — 99204 OFFICE O/P NEW MOD 45 MIN: CPT | Mod: 25

## 2023-03-21 RX ORDER — TELMISARTAN 40 MG/1
40 TABLET ORAL
Refills: 0 | Status: ACTIVE | COMMUNITY

## 2023-03-29 NOTE — HISTORY OF PRESENT ILLNESS
[de-identified] : 62 year old female presents for evaluation of chronic sinusitis\par Referred by Dr David Angel, MICHAEL[facility]\par Longstanding history of chronic sinusitis-- recurrent sinus infections, frequent nasal congestion, PND, discolored nasal discharge and facial pressure\par CT Sinus 10/23/22-- [facility]-- report only-- Left maxillary CRS with complete opacification. 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 Small right central incisor (tooth 8th) jonnie radicular lucency. Complete opacification of the left maxillary sinus with adjacent opacification of its ostiomeatal unit. Mild left ethmoid sinus mucosal thickening. S- shaped nasal septal deviation. Thickened inferior turbinate mucosa bilaterally. \par Currently with intermittent nasal congestion, clear anterior rhinorrhea\par hx recurrent left sided migraines\par hx occipital neuralgia- receiving nerve blocks and botox every 3-4 months x1 year\par Using saline rinses almost daily without improvement \par \par PMH: HLD, migraines, Asthma \par PSH:  D&C

## 2023-03-29 NOTE — REASON FOR VISIT
[Initial Consultation] : an initial consultation for [FreeTextEntry2] : Referred by Dr David Angel for chronic nasal congestion

## 2023-04-14 ENCOUNTER — APPOINTMENT (OUTPATIENT)
Dept: PHYSICAL MEDICINE AND REHAB | Facility: CLINIC | Age: 63
End: 2023-04-14
Payer: MEDICARE

## 2023-04-14 VITALS
SYSTOLIC BLOOD PRESSURE: 119 MMHG | OXYGEN SATURATION: 98 % | DIASTOLIC BLOOD PRESSURE: 83 MMHG | HEART RATE: 83 BPM | TEMPERATURE: 97 F

## 2023-04-14 PROCEDURE — 64405 NJX AA&/STRD GR OCPL NRV: CPT

## 2023-04-14 PROCEDURE — 99213 OFFICE O/P EST LOW 20 MIN: CPT | Mod: 25

## 2023-04-14 NOTE — HISTORY OF PRESENT ILLNESS
[FreeTextEntry1] : Patient returns today after being seen for a left occipital nerve block in February.  She is here for a right occipital nerve block.

## 2023-04-14 NOTE — PROCEDURE
[de-identified] : The right occipital region was palpated and cleansed with alcohol in sterile fashion.  Using a 27gauge 1.5 inch needle, a total of 8cc of 0.25% bupivicaine was injected in a fan-like distribution after negative aspiration.  The needle was removed and hemostasis was achieved.

## 2023-04-14 NOTE — ASSESSMENT
[FreeTextEntry1] : 63 year old female with occipital neuralgia and headache.  She did tolerate right occipital nerve block without compliant or complication.  She will return to see me at the earliest sign that her pain returns for further injection therapy as necessary.

## 2023-04-28 ENCOUNTER — APPOINTMENT (OUTPATIENT)
Dept: PHYSICAL MEDICINE AND REHAB | Facility: CLINIC | Age: 63
End: 2023-04-28
Payer: MEDICARE

## 2023-04-28 VITALS
TEMPERATURE: 95 F | HEART RATE: 80 BPM | SYSTOLIC BLOOD PRESSURE: 102 MMHG | OXYGEN SATURATION: 99 % | DIASTOLIC BLOOD PRESSURE: 56 MMHG

## 2023-04-28 PROCEDURE — 99213 OFFICE O/P EST LOW 20 MIN: CPT | Mod: 25

## 2023-04-28 PROCEDURE — 64405 NJX AA&/STRD GR OCPL NRV: CPT

## 2023-04-28 NOTE — PROCEDURE
[de-identified] : Left occipital region was palpated and cleansed with alcohol in sterile fashion.  Using a 25 guage 1.25 inch needle, a total of 8cc of 0.25% Bupivicaine was injected after negative aspiration in a fan-like distribution.  The needle was removed and hemostasis was achieved.

## 2023-04-28 NOTE — HISTORY OF PRESENT ILLNESS
[FreeTextEntry1] : Patient returns after being seen a couple of weeks ago.  She had a right occipital nerve block which is working well.  She is here for left occipital nerve block.

## 2023-04-28 NOTE — ASSESSMENT
[FreeTextEntry1] : 63 year old female with occiptal neuralgia.  She did tolerate the procedure well without compalint or complication.  She will return to see me at the earliest sign that her pain returns for further injection therapy as necessary.

## 2023-05-04 ENCOUNTER — APPOINTMENT (OUTPATIENT)
Dept: PAIN MANAGEMENT | Facility: CLINIC | Age: 63
End: 2023-05-04

## 2023-05-18 ENCOUNTER — APPOINTMENT (OUTPATIENT)
Dept: PAIN MANAGEMENT | Facility: CLINIC | Age: 63
End: 2023-05-18
Payer: MEDICARE

## 2023-05-18 VITALS
SYSTOLIC BLOOD PRESSURE: 112 MMHG | HEIGHT: 64 IN | DIASTOLIC BLOOD PRESSURE: 74 MMHG | WEIGHT: 140 LBS | HEART RATE: 84 BPM | BODY MASS INDEX: 23.9 KG/M2

## 2023-05-18 PROCEDURE — 99215 OFFICE O/P EST HI 40 MIN: CPT | Mod: 25

## 2023-05-18 PROCEDURE — 64615 CHEMODENERV MUSC MIGRAINE: CPT

## 2023-05-18 NOTE — HISTORY OF PRESENT ILLNESS
[FreeTextEntry1] : Pt is here today for Botox injections for migraine prevention.\par Pt benefits from Botox injections. Does experience a wear off effect with an increase in migraine frequency the last 2-3 weeks before the next injections.\par Discussed potential adverse effects of Botox injections including bleeding , bruising , infection , eye lidid , eye brow ptosis. Pt advised to avoid laying flat for 4 hours , do not exercise strenuously today and do not apply make- up or lotion to injection site. Pt verbalized understanding and wishes to proceed. \par Last Botox injections were2/3/22.\par \par Pt had a good response to Ubrelvy with last flare up of migraine.  [Headache] : headache [Chronic Headache] : chronic headache [Dizziness] : dizziness [Nausea] : nausea [Vomiting] : Vomiting [Photophobia] : photophobia [Phonophobia] : phonophobia [Neck Pain] : neck pain [> 15 days per month] : > 15 days per month

## 2023-05-25 ENCOUNTER — APPOINTMENT (OUTPATIENT)
Dept: PAIN MANAGEMENT | Facility: CLINIC | Age: 63
End: 2023-05-25
Payer: MEDICARE

## 2023-05-25 VITALS
BODY MASS INDEX: 23.9 KG/M2 | HEIGHT: 64 IN | DIASTOLIC BLOOD PRESSURE: 74 MMHG | HEART RATE: 77 BPM | SYSTOLIC BLOOD PRESSURE: 130 MMHG | WEIGHT: 140 LBS

## 2023-05-25 PROCEDURE — 99214 OFFICE O/P EST MOD 30 MIN: CPT

## 2023-05-25 RX ORDER — HYDROXYZINE HYDROCHLORIDE 25 MG/1
25 TABLET ORAL
Qty: 60 | Refills: 2 | Status: ACTIVE | COMMUNITY
Start: 2023-05-25 | End: 1900-01-01

## 2023-06-01 ENCOUNTER — RX RENEWAL (OUTPATIENT)
Age: 63
End: 2023-06-01

## 2023-06-03 NOTE — REVIEW OF SYSTEMS
[Feeling Poorly] : feeling poorly [Arthralgias] : arthralgias [Sleep Disturbances] : sleep disturbances [Fever] : no fever [Cough] : no cough [Itching] : no itching [Fainting] : no fainting [Muscle Weakness] : no muscle weakness

## 2023-06-03 NOTE — ASSESSMENT
[FreeTextEntry1] : Would retry vyepti at 300mg/ dose\par trial of prn hydroxyzine\par continue Botox

## 2023-06-03 NOTE — HISTORY OF PRESENT ILLNESS
[FreeTextEntry1] : Pt notes that she had episodes of worsening headache towards end of Botox cycle- particularly since there was delay since last injections.\par REviews treatment with Dr. Berumen a few years following 9/11 and then with Dr. Garsia prior to seeing us.\par DOes have questions re: treatments.\par Has been receiving ONB bilateral with Dr. Gupta which are somewhat helpful.\par Still sees weather influence.\par Still with frequent break through episodes. [Chronic Headache] : chronic headache [Nausea] : nausea [Photophobia] : photophobia [Phonophobia] : phonophobia [Neck Pain] : neck pain [Scalp Tenderness] : scalp tenderness [> 15 days per month] : > 15 days per month [> 4 hours] : > 4 hours [Stable] : The patient reports ~his/her~ symptoms since the last visit are stable

## 2023-06-23 ENCOUNTER — APPOINTMENT (OUTPATIENT)
Dept: PHYSICAL MEDICINE AND REHAB | Facility: CLINIC | Age: 63
End: 2023-06-23
Payer: MEDICARE

## 2023-06-23 VITALS — OXYGEN SATURATION: 95 % | HEART RATE: 65 BPM | SYSTOLIC BLOOD PRESSURE: 114 MMHG | DIASTOLIC BLOOD PRESSURE: 74 MMHG

## 2023-06-23 PROCEDURE — 64405 NJX AA&/STRD GR OCPL NRV: CPT | Mod: RT

## 2023-06-23 NOTE — HISTORY OF PRESENT ILLNESS
[FreeTextEntry1] : Patient returns after being seen in April.  She returns today for right occipital nerve block.

## 2023-06-23 NOTE — PROCEDURE
[de-identified] : The right occipital region was palpated and marked.  After thoroughly cleansing each area with alcohol, a total of 8 cc of 0.25% Bupivicaine was injected using a 27 gauge 1.5 inch needle in a fan-like distribution after negative aspiration.  After injection, the needle was removed and hemostasis was achieved.  Patient tolerated the procedure well without complaint or complication.\par

## 2023-06-23 NOTE — ASSESSMENT
[FreeTextEntry1] : 63 year old female with occipital neuralgia.  She did tolerate the procedure without complaint or complication.  She will return to see me in 2 weeks for her left occipital nerve block.

## 2023-06-29 ENCOUNTER — OUTPATIENT (OUTPATIENT)
Dept: OUTPATIENT SERVICES | Facility: HOSPITAL | Age: 63
LOS: 1 days | End: 2023-06-29
Payer: MEDICARE

## 2023-06-29 VITALS
TEMPERATURE: 99 F | DIASTOLIC BLOOD PRESSURE: 61 MMHG | HEIGHT: 64 IN | WEIGHT: 141.98 LBS | SYSTOLIC BLOOD PRESSURE: 112 MMHG | HEART RATE: 76 BPM | OXYGEN SATURATION: 97 % | RESPIRATION RATE: 18 BRPM

## 2023-06-29 DIAGNOSIS — Z90.89 ACQUIRED ABSENCE OF OTHER ORGANS: Chronic | ICD-10-CM

## 2023-06-29 DIAGNOSIS — Z98.890 OTHER SPECIFIED POSTPROCEDURAL STATES: Chronic | ICD-10-CM

## 2023-06-29 DIAGNOSIS — J32.9 CHRONIC SINUSITIS, UNSPECIFIED: ICD-10-CM

## 2023-06-29 LAB
ALBUMIN SERPL ELPH-MCNC: 4.5 G/DL — SIGNIFICANT CHANGE UP (ref 3.3–5)
ALP SERPL-CCNC: 62 U/L — SIGNIFICANT CHANGE UP (ref 40–120)
ALT FLD-CCNC: 21 U/L — SIGNIFICANT CHANGE UP (ref 4–33)
ANION GAP SERPL CALC-SCNC: 12 MMOL/L — SIGNIFICANT CHANGE UP (ref 7–14)
AST SERPL-CCNC: 18 U/L — SIGNIFICANT CHANGE UP (ref 4–32)
BILIRUB SERPL-MCNC: 0.4 MG/DL — SIGNIFICANT CHANGE UP (ref 0.2–1.2)
BUN SERPL-MCNC: 11 MG/DL — SIGNIFICANT CHANGE UP (ref 7–23)
CALCIUM SERPL-MCNC: 10.7 MG/DL — HIGH (ref 8.4–10.5)
CHLORIDE SERPL-SCNC: 104 MMOL/L — SIGNIFICANT CHANGE UP (ref 98–107)
CO2 SERPL-SCNC: 23 MMOL/L — SIGNIFICANT CHANGE UP (ref 22–31)
CREAT SERPL-MCNC: 0.74 MG/DL — SIGNIFICANT CHANGE UP (ref 0.5–1.3)
EGFR: 91 ML/MIN/1.73M2 — SIGNIFICANT CHANGE UP
GLUCOSE SERPL-MCNC: 93 MG/DL — SIGNIFICANT CHANGE UP (ref 70–99)
HCT VFR BLD CALC: 42.5 % — SIGNIFICANT CHANGE UP (ref 34.5–45)
HGB BLD-MCNC: 14.5 G/DL — SIGNIFICANT CHANGE UP (ref 11.5–15.5)
MCHC RBC-ENTMCNC: 31.7 PG — SIGNIFICANT CHANGE UP (ref 27–34)
MCHC RBC-ENTMCNC: 34.1 GM/DL — SIGNIFICANT CHANGE UP (ref 32–36)
MCV RBC AUTO: 92.8 FL — SIGNIFICANT CHANGE UP (ref 80–100)
NRBC # BLD: 0 /100 WBCS — SIGNIFICANT CHANGE UP (ref 0–0)
NRBC # FLD: 0 K/UL — SIGNIFICANT CHANGE UP (ref 0–0)
PLATELET # BLD AUTO: 299 K/UL — SIGNIFICANT CHANGE UP (ref 150–400)
POTASSIUM SERPL-MCNC: 4.4 MMOL/L — SIGNIFICANT CHANGE UP (ref 3.5–5.3)
POTASSIUM SERPL-SCNC: 4.4 MMOL/L — SIGNIFICANT CHANGE UP (ref 3.5–5.3)
PROT SERPL-MCNC: 7.2 G/DL — SIGNIFICANT CHANGE UP (ref 6–8.3)
RBC # BLD: 4.58 M/UL — SIGNIFICANT CHANGE UP (ref 3.8–5.2)
RBC # FLD: 12.5 % — SIGNIFICANT CHANGE UP (ref 10.3–14.5)
SODIUM SERPL-SCNC: 139 MMOL/L — SIGNIFICANT CHANGE UP (ref 135–145)
WBC # BLD: 8.24 K/UL — SIGNIFICANT CHANGE UP (ref 3.8–10.5)
WBC # FLD AUTO: 8.24 K/UL — SIGNIFICANT CHANGE UP (ref 3.8–10.5)

## 2023-06-29 PROCEDURE — 93010 ELECTROCARDIOGRAM REPORT: CPT

## 2023-06-29 RX ORDER — SODIUM CHLORIDE 9 MG/ML
1000 INJECTION, SOLUTION INTRAVENOUS
Refills: 0 | Status: DISCONTINUED | OUTPATIENT
Start: 2023-07-10 | End: 2023-07-24

## 2023-06-29 NOTE — H&P PST ADULT - AGENT'S NAME
COVID-19 PCR test completed. Patient handout For Patients Who Have Been Tested for Covid-19 (Coronavirus) was given to the patient, which includes test result notification process.   Angeles Rush-sister

## 2023-06-29 NOTE — H&P PST ADULT - NSICDXFAMILYHX_GEN_ALL_CORE_FT
FAMILY HISTORY:  Father  Still living? No  FH: heart attack, Age at diagnosis: Age Unknown  FH: HTN (hypertension), Age at diagnosis: Age Unknown    Mother  Still living? No  FH: Alzheimers disease, Age at diagnosis: Age Unknown    Grandparent  Still living? No  FH: HTN (hypertension), Age at diagnosis: Age Unknown  FH: HTN (hypertension), Age at diagnosis: Age Unknown  FH: HTN (hypertension), Age at diagnosis: Age Unknown  FH: emphysema, Age at diagnosis: Age Unknown    Aunt  Still living? Unknown  FH: HTN (hypertension), Age at diagnosis: Age Unknown

## 2023-06-29 NOTE — H&P PST ADULT - NSICDXPASTSURGICALHX_GEN_ALL_CORE_FT
PAST SURGICAL HISTORY:  H/O dilation and curettage     H/O laparoscopy     History of tonsillectomy     Status post embolization of uterine artery

## 2023-06-29 NOTE — H&P PST ADULT - MUSCULOSKELETAL
negative ? syncope normal/ROM intact/normal gait/strength 5/5 bilateral upper extremities/strength 5/5 bilateral lower extremities

## 2023-06-29 NOTE — H&P PST ADULT - NSICDXPASTMEDICALHX_GEN_ALL_CORE_FT
PAST MEDICAL HISTORY:  Endometriosis     Fibroids     GERD (gastroesophageal reflux disease)     Hemorrhoids     HTN (hypertension)     Hyperlipidemia     Migraines     Nephrolithiasis     Recurrent sinusitis     Seasonal allergies     Tachycardia      PAST MEDICAL HISTORY:  Asthma     Endometriosis     Fibroids     GERD (gastroesophageal reflux disease)     Hemorrhoids     HTN (hypertension)     Hyperlipidemia     Migraines     Nephrolithiasis     Recurrent sinusitis     Seasonal allergies     Tachycardia

## 2023-06-29 NOTE — H&P PST ADULT - PROBLEM SELECTOR PLAN 1
Pt. is scheduled for an endoscopic sinus surgery, possible septoplasty 7/10/23.  Pt. verbalized understanding of instructions.

## 2023-07-07 ENCOUNTER — APPOINTMENT (OUTPATIENT)
Dept: PHYSICAL MEDICINE AND REHAB | Facility: CLINIC | Age: 63
End: 2023-07-07
Payer: MEDICARE

## 2023-07-07 VITALS — SYSTOLIC BLOOD PRESSURE: 107 MMHG | HEART RATE: 89 BPM | OXYGEN SATURATION: 96 % | DIASTOLIC BLOOD PRESSURE: 71 MMHG

## 2023-07-07 PROBLEM — N20.0 CALCULUS OF KIDNEY: Chronic | Status: ACTIVE | Noted: 2023-06-29

## 2023-07-07 PROBLEM — G43.909 MIGRAINE, UNSPECIFIED, NOT INTRACTABLE, WITHOUT STATUS MIGRAINOSUS: Chronic | Status: ACTIVE | Noted: 2023-06-29

## 2023-07-07 PROBLEM — K21.9 GASTRO-ESOPHAGEAL REFLUX DISEASE WITHOUT ESOPHAGITIS: Chronic | Status: ACTIVE | Noted: 2023-06-29

## 2023-07-07 PROBLEM — I10 ESSENTIAL (PRIMARY) HYPERTENSION: Chronic | Status: ACTIVE | Noted: 2023-06-29

## 2023-07-07 PROBLEM — D21.9 BENIGN NEOPLASM OF CONNECTIVE AND OTHER SOFT TISSUE, UNSPECIFIED: Chronic | Status: ACTIVE | Noted: 2023-06-29

## 2023-07-07 PROBLEM — J32.9 CHRONIC SINUSITIS, UNSPECIFIED: Chronic | Status: ACTIVE | Noted: 2023-06-29

## 2023-07-07 PROBLEM — J30.2 OTHER SEASONAL ALLERGIC RHINITIS: Chronic | Status: ACTIVE | Noted: 2023-06-29

## 2023-07-07 PROBLEM — J45.909 UNSPECIFIED ASTHMA, UNCOMPLICATED: Chronic | Status: ACTIVE | Noted: 2023-06-29

## 2023-07-07 PROBLEM — E78.5 HYPERLIPIDEMIA, UNSPECIFIED: Chronic | Status: ACTIVE | Noted: 2023-06-29

## 2023-07-07 PROBLEM — R00.0 TACHYCARDIA, UNSPECIFIED: Chronic | Status: ACTIVE | Noted: 2023-06-29

## 2023-07-07 PROBLEM — N80.9 ENDOMETRIOSIS, UNSPECIFIED: Chronic | Status: ACTIVE | Noted: 2023-06-29

## 2023-07-07 PROBLEM — K64.9 UNSPECIFIED HEMORRHOIDS: Chronic | Status: ACTIVE | Noted: 2023-06-29

## 2023-07-07 PROCEDURE — 64405 NJX AA&/STRD GR OCPL NRV: CPT | Mod: LT

## 2023-07-07 NOTE — HISTORY OF PRESENT ILLNESS
[FreeTextEntry1] : Patient returns after being seen a couple of weeks ago for her occipital nerve block.  She states it helped her and she is here for a left sided occipital nerve block.

## 2023-07-07 NOTE — ASU PATIENT PROFILE, ADULT - FALL HARM RISK - UNIVERSAL INTERVENTIONS
Bed in lowest position, wheels locked, appropriate side rails in place/Call bell, personal items and telephone in reach/Instruct patient to call for assistance before getting out of bed or chair/Non-slip footwear when patient is out of bed/[place] to call system/Physically safe environment - no spills, clutter or unnecessary equipment/Purposeful Proactive Rounding/Room/bathroom lighting operational, light cord in reach

## 2023-07-07 NOTE — ASSESSMENT
[FreeTextEntry1] : 63 year old female with occipital neuralgia.  She did tolerate left occipital nerve block without complaint or complication.  She will return to see me for further injection therapy as necessary.

## 2023-07-07 NOTE — ASU PATIENT PROFILE, ADULT - NS PRO ABUSE SCREEN SUSPICION NEGLECT YN
ROS: (+)throat discomfort. No dysphagia. No fever/chills. No eye pain/changes in vision, No ear pain/sore throat/dysphagia, No chest pain/palpitations. No SOB/cough/. No abdominal pain, N/V/D, no black/bloody bm. No dysuria/frequency/discharge, No headache. No Dizziness.    No rashes or breaks in skin. No numbness/tingling/weakness. ROS: (+)throat discomfort. No fever/chills. No eye pain/changes in vision, No ear pain/dysphagia, No chest pain/palpitations. No SOB/cough/. No abdominal pain, N/V/D, no black/bloody bm. No dysuria/frequency/discharge, No headache. No Dizziness.    No rashes or breaks in skin. No numbness/tingling/weakness. no

## 2023-07-07 NOTE — PROCEDURE
[de-identified] : Patient agreed to performing trigger point injections.  Patient was place in the sitting position. The left occipital nerve region was palpated and cleansed with alcohol in sterile fashion.  A total of 8 cc of 0.25% Bupivicaine was injected using a 27 gauge 1.5 inch needle in a fan-like distribution after negative aspiration.  The needle was removed and hemostasis was achieved.  Patient tolerated the procedure well without complaint or complication.\par

## 2023-07-07 NOTE — ASU PATIENT PROFILE, ADULT - NSICDXPASTMEDICALHX_GEN_ALL_CORE_FT
PAST MEDICAL HISTORY:  Asthma     Endometriosis     Fibroids     GERD (gastroesophageal reflux disease)     Hemorrhoids     HTN (hypertension)     Hyperlipidemia     Migraines     Nephrolithiasis     Recurrent sinusitis     Seasonal allergies     Tachycardia

## 2023-07-09 ENCOUNTER — TRANSCRIPTION ENCOUNTER (OUTPATIENT)
Age: 63
End: 2023-07-09

## 2023-07-10 ENCOUNTER — RX RENEWAL (OUTPATIENT)
Age: 63
End: 2023-07-10

## 2023-07-10 ENCOUNTER — RESULT REVIEW (OUTPATIENT)
Age: 63
End: 2023-07-10

## 2023-07-10 ENCOUNTER — OUTPATIENT (OUTPATIENT)
Dept: OUTPATIENT SERVICES | Facility: HOSPITAL | Age: 63
LOS: 1 days | Discharge: HOME | End: 2023-07-10
Payer: MEDICARE

## 2023-07-10 ENCOUNTER — APPOINTMENT (OUTPATIENT)
Dept: OTOLARYNGOLOGY | Facility: HOSPITAL | Age: 63
End: 2023-07-10

## 2023-07-10 ENCOUNTER — TRANSCRIPTION ENCOUNTER (OUTPATIENT)
Age: 63
End: 2023-07-10

## 2023-07-10 VITALS
SYSTOLIC BLOOD PRESSURE: 137 MMHG | HEART RATE: 77 BPM | RESPIRATION RATE: 16 BRPM | DIASTOLIC BLOOD PRESSURE: 65 MMHG | HEIGHT: 64 IN | TEMPERATURE: 98 F | OXYGEN SATURATION: 100 % | WEIGHT: 141.98 LBS

## 2023-07-10 VITALS
RESPIRATION RATE: 16 BRPM | DIASTOLIC BLOOD PRESSURE: 73 MMHG | TEMPERATURE: 98 F | OXYGEN SATURATION: 100 % | SYSTOLIC BLOOD PRESSURE: 140 MMHG | HEART RATE: 93 BPM

## 2023-07-10 DIAGNOSIS — Z98.890 OTHER SPECIFIED POSTPROCEDURAL STATES: Chronic | ICD-10-CM

## 2023-07-10 DIAGNOSIS — J32.9 CHRONIC SINUSITIS, UNSPECIFIED: ICD-10-CM

## 2023-07-10 DIAGNOSIS — Z90.89 ACQUIRED ABSENCE OF OTHER ORGANS: Chronic | ICD-10-CM

## 2023-07-10 PROCEDURE — 31255 NSL/SINS NDSC W/TOT ETHMDCT: CPT

## 2023-07-10 PROCEDURE — 88305 TISSUE EXAM BY PATHOLOGIST: CPT | Mod: 26

## 2023-07-10 PROCEDURE — 61782 SCAN PROC CRANIAL EXTRA: CPT

## 2023-07-10 PROCEDURE — 31267 ENDOSCOPY MAXILLARY SINUS: CPT

## 2023-07-10 DEVICE — STENT DRUG ELUTING SINUS BIO ABSORB INTERSECT ENT PROPEL 23MM: Type: IMPLANTABLE DEVICE | Status: FUNCTIONAL

## 2023-07-10 RX ORDER — ALBUTEROL 90 UG/1
2 AEROSOL, METERED ORAL
Refills: 0 | DISCHARGE

## 2023-07-10 RX ORDER — EZETIMIBE 10 MG/1
1 TABLET ORAL
Refills: 0 | DISCHARGE

## 2023-07-10 RX ORDER — ACETAMINOPHEN 500 MG
650 TABLET ORAL EVERY 6 HOURS
Refills: 0 | Status: DISCONTINUED | OUTPATIENT
Start: 2023-07-10 | End: 2023-07-24

## 2023-07-10 RX ORDER — EVOLOCUMAB 140 MG/ML
140 INJECTION, SOLUTION SUBCUTANEOUS
Refills: 0 | DISCHARGE

## 2023-07-10 RX ORDER — CARVEDILOL PHOSPHATE 80 MG/1
1 CAPSULE, EXTENDED RELEASE ORAL
Refills: 0 | DISCHARGE

## 2023-07-10 RX ORDER — PANTOPRAZOLE SODIUM 20 MG/1
1 TABLET, DELAYED RELEASE ORAL
Refills: 0 | DISCHARGE

## 2023-07-10 RX ORDER — TELMISARTAN 20 MG/1
1 TABLET ORAL
Refills: 0 | DISCHARGE

## 2023-07-10 RX ORDER — HYDROXYZINE HCL 10 MG
1 TABLET ORAL
Refills: 0 | DISCHARGE

## 2023-07-10 RX ORDER — UBROGEPANT 100 MG/1
1 TABLET ORAL
Refills: 0 | DISCHARGE

## 2023-07-10 RX ORDER — ICOSAPENT ETHYL 500 MG/1
0 CAPSULE, LIQUID FILLED ORAL
Refills: 0 | DISCHARGE

## 2023-07-10 RX ORDER — ONDANSETRON 8 MG/1
4 TABLET, FILM COATED ORAL ONCE
Refills: 0 | Status: DISCONTINUED | OUTPATIENT
Start: 2023-07-10 | End: 2023-07-24

## 2023-07-10 RX ORDER — GABAPENTIN 400 MG/1
2 CAPSULE ORAL
Refills: 0 | DISCHARGE

## 2023-07-10 RX ORDER — FENTANYL CITRATE 50 UG/ML
50 INJECTION INTRAVENOUS
Refills: 0 | Status: DISCONTINUED | OUTPATIENT
Start: 2023-07-10 | End: 2023-07-10

## 2023-07-10 RX ORDER — MEMANTINE HYDROCHLORIDE 10 MG/1
1 TABLET ORAL
Refills: 0 | DISCHARGE

## 2023-07-10 RX ORDER — ACETAMINOPHEN 500 MG
2 TABLET ORAL
Qty: 0 | Refills: 0 | DISCHARGE
Start: 2023-07-10

## 2023-07-10 NOTE — BRIEF OPERATIVE NOTE - NSICDXBRIEFPROCEDURE_GEN_ALL_CORE_FT
PROCEDURES:  Endoscopic maxillary antrostomy 10-Jul-2023 09:17:27  Liliana Spence  Endoscopic total ethmoidectomy 10-Jul-2023 09:17:35  Liliana Spence

## 2023-07-11 ENCOUNTER — NON-APPOINTMENT (OUTPATIENT)
Age: 63
End: 2023-07-11

## 2023-07-18 ENCOUNTER — APPOINTMENT (OUTPATIENT)
Dept: OTOLARYNGOLOGY | Facility: CLINIC | Age: 63
End: 2023-07-18
Payer: MEDICARE

## 2023-07-18 VITALS
BODY MASS INDEX: 23.9 KG/M2 | HEIGHT: 64 IN | WEIGHT: 140 LBS | HEART RATE: 80 BPM | SYSTOLIC BLOOD PRESSURE: 106 MMHG | DIASTOLIC BLOOD PRESSURE: 71 MMHG

## 2023-07-18 LAB — SURGICAL PATHOLOGY STUDY: SIGNIFICANT CHANGE UP

## 2023-07-18 PROCEDURE — 99024 POSTOP FOLLOW-UP VISIT: CPT

## 2023-07-18 PROCEDURE — 31237 NSL/SINS NDSC SURG BX POLYPC: CPT | Mod: LT

## 2023-07-18 NOTE — REASON FOR VISIT
[Post-Operative Visit] : a post-operative visit [FreeTextEntry2] : s/p L maxillary antrostomy and total ethmoidectomy 7/10/23

## 2023-07-18 NOTE — HISTORY OF PRESENT ILLNESS
[de-identified] : 63 year old female presents for post op evaluation\par s/p L maxillary antrostomy and total ethmoidectomy 7/10/23\par OPERATION:\par 1. Left endoscopic maxillary antrostomy with tissue removal\par 2. Left endoscopic total ethmoidectomy\par 3. Use of stereotactic image navigation extradural\par \par Taking Doxycycline daily and using Saline irrigations BID.\par Reports appropriate post op congestion.\par Denies nasal drainage, bleeding, fever and pressure.

## 2023-08-01 ENCOUNTER — APPOINTMENT (OUTPATIENT)
Dept: NEUROLOGY | Facility: CLINIC | Age: 63
End: 2023-08-01
Payer: MEDICARE

## 2023-08-01 PROCEDURE — 96365 THER/PROPH/DIAG IV INF INIT: CPT

## 2023-08-15 ENCOUNTER — APPOINTMENT (OUTPATIENT)
Dept: OTOLARYNGOLOGY | Facility: CLINIC | Age: 63
End: 2023-08-15
Payer: MEDICARE

## 2023-08-15 VITALS
DIASTOLIC BLOOD PRESSURE: 74 MMHG | BODY MASS INDEX: 24.75 KG/M2 | WEIGHT: 145 LBS | SYSTOLIC BLOOD PRESSURE: 111 MMHG | HEART RATE: 76 BPM | HEIGHT: 64 IN

## 2023-08-15 PROCEDURE — 99024 POSTOP FOLLOW-UP VISIT: CPT

## 2023-08-15 PROCEDURE — 31237 NSL/SINS NDSC SURG BX POLYPC: CPT | Mod: LT

## 2023-08-15 RX ORDER — OXYCODONE 5 MG/1
5 TABLET ORAL
Qty: 12 | Refills: 0 | Status: DISCONTINUED | COMMUNITY
Start: 2023-07-09 | End: 2023-08-15

## 2023-08-15 RX ORDER — DOXYCYCLINE 100 MG/1
100 CAPSULE ORAL
Qty: 14 | Refills: 0 | Status: DISCONTINUED | COMMUNITY
Start: 2023-07-09 | End: 2023-08-15

## 2023-08-15 NOTE — REASON FOR VISIT
[Subsequent Evaluation] : a subsequent evaluation for [FreeTextEntry2] : s/p L maxillary antrostomy and total ethmoidectomy 7/10/23

## 2023-08-15 NOTE — HISTORY OF PRESENT ILLNESS
[de-identified] : 63 year old female presents for post-op evaluation  s/p L maxillary antrostomy and total ethmoidectomy 7/10/23 LCV 7/18/23 at which time debridement States overall doing well Using saline rinses 2x a day  Minimal nasal congestion No recent PND, facial pain/pressure, epistaxis Sense of smell is good No recent fevers or sinus infections  OPERATION:  1. Left endoscopic maxillary antrostomy with tissue removal  2. Left endoscopic total ethmoidectomy  3. Use of stereotactic image navigation extradural

## 2023-08-18 ENCOUNTER — APPOINTMENT (OUTPATIENT)
Dept: PAIN MANAGEMENT | Facility: CLINIC | Age: 63
End: 2023-08-18
Payer: MEDICARE

## 2023-08-18 VITALS
HEIGHT: 64 IN | SYSTOLIC BLOOD PRESSURE: 108 MMHG | BODY MASS INDEX: 24.75 KG/M2 | WEIGHT: 145 LBS | DIASTOLIC BLOOD PRESSURE: 70 MMHG | HEART RATE: 89 BPM

## 2023-08-18 PROCEDURE — 99214 OFFICE O/P EST MOD 30 MIN: CPT | Mod: 25

## 2023-08-18 PROCEDURE — 64615 CHEMODENERV MUSC MIGRAINE: CPT

## 2023-08-18 RX ORDER — ATOGEPANT 60 MG/1
TABLET ORAL
Qty: 90 | Refills: 3 | Status: DISCONTINUED | OUTPATIENT
Start: 2022-03-17 | End: 2023-08-18

## 2023-08-18 NOTE — HISTORY OF PRESENT ILLNESS
[FreeTextEntry1] : Pt is here today for Botox injections for migraine prevention. Pt benefits from Botox injections. Does experience a wear off effect with an increase in migraine frequency the last 2-3 weeks before the next injections. Discussed potential adverse effects of Botox injections including bleeding , bruising , infection , eye lidid , eye brow ptosis. Pt advised to avoid laying flat for 4 hours , do not exercise strenuously today and do not apply make- up or lotion to injection site. Pt verbalized understanding and wishes to proceed.    Pt is s/p sinus surgery 7/10/23 [Headache] : headache [Chronic Headache] : chronic headache [Dizziness] : dizziness [Nausea] : nausea [Vomiting] : Vomiting [Photophobia] : photophobia [Phonophobia] : phonophobia [Neck Pain] : neck pain [> 15 days per month] : > 15 days per month

## 2023-08-18 NOTE — ASSESSMENT
[FreeTextEntry1] : Botox injections completed today .  No change in other medications. RTO 12 weeks.

## 2023-09-20 ENCOUNTER — APPOINTMENT (OUTPATIENT)
Dept: PHYSICAL MEDICINE AND REHAB | Facility: CLINIC | Age: 63
End: 2023-09-20
Payer: MEDICARE

## 2023-09-20 PROCEDURE — 64405 NJX AA&/STRD GR OCPL NRV: CPT | Mod: RT

## 2023-10-04 ENCOUNTER — APPOINTMENT (OUTPATIENT)
Dept: OTOLARYNGOLOGY | Facility: CLINIC | Age: 63
End: 2023-10-04
Payer: MEDICARE

## 2023-10-04 ENCOUNTER — APPOINTMENT (OUTPATIENT)
Dept: PHYSICAL MEDICINE AND REHAB | Facility: CLINIC | Age: 63
End: 2023-10-04
Payer: MEDICARE

## 2023-10-04 VITALS
SYSTOLIC BLOOD PRESSURE: 114 MMHG | WEIGHT: 145 LBS | HEIGHT: 64 IN | BODY MASS INDEX: 24.75 KG/M2 | DIASTOLIC BLOOD PRESSURE: 75 MMHG | HEART RATE: 60 BPM

## 2023-10-04 PROCEDURE — 64405 NJX AA&/STRD GR OCPL NRV: CPT | Mod: LT

## 2023-10-04 PROCEDURE — 99213 OFFICE O/P EST LOW 20 MIN: CPT | Mod: 25

## 2023-10-04 PROCEDURE — 31237 NSL/SINS NDSC SURG BX POLYPC: CPT | Mod: LT

## 2023-10-14 ENCOUNTER — EMERGENCY (EMERGENCY)
Facility: HOSPITAL | Age: 63
LOS: 1 days | Discharge: HOME | End: 2023-10-14
Attending: EMERGENCY MEDICINE
Payer: MEDICARE

## 2023-10-14 VITALS
RESPIRATION RATE: 20 BRPM | OXYGEN SATURATION: 100 % | TEMPERATURE: 98 F | HEIGHT: 67 IN | WEIGHT: 139.99 LBS | HEART RATE: 100 BPM | SYSTOLIC BLOOD PRESSURE: 144 MMHG | DIASTOLIC BLOOD PRESSURE: 81 MMHG

## 2023-10-14 VITALS
TEMPERATURE: 98 F | SYSTOLIC BLOOD PRESSURE: 138 MMHG | RESPIRATION RATE: 16 BRPM | HEART RATE: 82 BPM | DIASTOLIC BLOOD PRESSURE: 85 MMHG | OXYGEN SATURATION: 98 %

## 2023-10-14 DIAGNOSIS — Z98.890 OTHER SPECIFIED POSTPROCEDURAL STATES: Chronic | ICD-10-CM

## 2023-10-14 DIAGNOSIS — Z90.89 ACQUIRED ABSENCE OF OTHER ORGANS: Chronic | ICD-10-CM

## 2023-10-14 LAB
ALBUMIN SERPL ELPH-MCNC: 4.5 G/DL — SIGNIFICANT CHANGE UP (ref 3.3–5)
ALP SERPL-CCNC: 63 U/L — SIGNIFICANT CHANGE UP (ref 40–120)
ALT FLD-CCNC: 28 U/L — SIGNIFICANT CHANGE UP (ref 10–45)
ANION GAP SERPL CALC-SCNC: 17 MMOL/L — SIGNIFICANT CHANGE UP (ref 5–17)
APPEARANCE UR: ABNORMAL
AST SERPL-CCNC: 23 U/L — SIGNIFICANT CHANGE UP (ref 10–40)
BACTERIA # UR AUTO: ABNORMAL
BILIRUB SERPL-MCNC: 0.4 MG/DL — SIGNIFICANT CHANGE UP (ref 0.2–1.2)
BILIRUB UR-MCNC: NEGATIVE — SIGNIFICANT CHANGE UP
BUN SERPL-MCNC: 16 MG/DL — SIGNIFICANT CHANGE UP (ref 7–23)
CALCIUM SERPL-MCNC: 10.7 MG/DL — HIGH (ref 8.4–10.5)
CHLORIDE SERPL-SCNC: 105 MMOL/L — SIGNIFICANT CHANGE UP (ref 96–108)
CO2 SERPL-SCNC: 19 MMOL/L — LOW (ref 22–31)
COD CRY URNS QL: ABNORMAL
COLOR SPEC: YELLOW — SIGNIFICANT CHANGE UP
CREAT SERPL-MCNC: 0.71 MG/DL — SIGNIFICANT CHANGE UP (ref 0.5–1.3)
DIFF PNL FLD: ABNORMAL
EGFR: 96 ML/MIN/1.73M2 — SIGNIFICANT CHANGE UP
EPI CELLS # UR: 0 — SIGNIFICANT CHANGE UP
GLUCOSE SERPL-MCNC: 121 MG/DL — HIGH (ref 70–99)
GLUCOSE UR QL: NEGATIVE — SIGNIFICANT CHANGE UP
HCT VFR BLD CALC: 44.5 % — SIGNIFICANT CHANGE UP (ref 34.5–45)
HGB BLD-MCNC: 14.9 G/DL — SIGNIFICANT CHANGE UP (ref 11.5–15.5)
HYALINE CASTS # UR AUTO: ABNORMAL /LPF
KETONES UR-MCNC: SIGNIFICANT CHANGE UP
LEUKOCYTE ESTERASE UR-ACNC: ABNORMAL
LIDOCAIN IGE QN: 26 U/L — SIGNIFICANT CHANGE UP (ref 7–60)
MCHC RBC-ENTMCNC: 29.9 PG — SIGNIFICANT CHANGE UP (ref 27–34)
MCHC RBC-ENTMCNC: 33.5 GM/DL — SIGNIFICANT CHANGE UP (ref 32–36)
MCV RBC AUTO: 89.4 FL — SIGNIFICANT CHANGE UP (ref 80–100)
NITRITE UR-MCNC: NEGATIVE — SIGNIFICANT CHANGE UP
NRBC # BLD: 0 /100 WBCS — SIGNIFICANT CHANGE UP (ref 0–0)
PH UR: 6 — SIGNIFICANT CHANGE UP (ref 5–8)
PLATELET # BLD AUTO: 315 K/UL — SIGNIFICANT CHANGE UP (ref 150–400)
POTASSIUM SERPL-MCNC: 4.3 MMOL/L — SIGNIFICANT CHANGE UP (ref 3.5–5.3)
POTASSIUM SERPL-SCNC: 4.3 MMOL/L — SIGNIFICANT CHANGE UP (ref 3.5–5.3)
PROT SERPL-MCNC: 7.5 G/DL — SIGNIFICANT CHANGE UP (ref 6–8.3)
PROT UR-MCNC: ABNORMAL
RBC # BLD: 4.98 M/UL — SIGNIFICANT CHANGE UP (ref 3.8–5.2)
RBC # FLD: 12.4 % — SIGNIFICANT CHANGE UP (ref 10.3–14.5)
RBC CASTS # UR COMP ASSIST: 3 /HPF — SIGNIFICANT CHANGE UP (ref 0–4)
SODIUM SERPL-SCNC: 141 MMOL/L — SIGNIFICANT CHANGE UP (ref 135–145)
SP GR SPEC: 1.03 — HIGH (ref 1.01–1.02)
UROBILINOGEN FLD QL: NEGATIVE — SIGNIFICANT CHANGE UP
WBC # BLD: 10.82 K/UL — HIGH (ref 3.8–10.5)
WBC # FLD AUTO: 10.82 K/UL — HIGH (ref 3.8–10.5)
WBC UR QL: ABNORMAL

## 2023-10-14 PROCEDURE — 99284 EMERGENCY DEPT VISIT MOD MDM: CPT | Mod: 25

## 2023-10-14 PROCEDURE — 81001 URINALYSIS AUTO W/SCOPE: CPT

## 2023-10-14 PROCEDURE — 85027 COMPLETE CBC AUTOMATED: CPT

## 2023-10-14 PROCEDURE — 80053 COMPREHEN METABOLIC PANEL: CPT

## 2023-10-14 PROCEDURE — 96374 THER/PROPH/DIAG INJ IV PUSH: CPT

## 2023-10-14 PROCEDURE — 99284 EMERGENCY DEPT VISIT MOD MDM: CPT | Mod: GC

## 2023-10-14 PROCEDURE — 83690 ASSAY OF LIPASE: CPT

## 2023-10-14 RX ORDER — PANTOPRAZOLE SODIUM 20 MG/1
40 TABLET, DELAYED RELEASE ORAL
Refills: 0 | Status: DISCONTINUED | OUTPATIENT
Start: 2023-10-14 | End: 2023-10-17

## 2023-10-14 RX ORDER — SODIUM CHLORIDE 9 MG/ML
1000 INJECTION INTRAMUSCULAR; INTRAVENOUS; SUBCUTANEOUS ONCE
Refills: 0 | Status: COMPLETED | OUTPATIENT
Start: 2023-10-14 | End: 2023-10-14

## 2023-10-14 RX ORDER — ONDANSETRON 8 MG/1
4 TABLET, FILM COATED ORAL ONCE
Refills: 0 | Status: COMPLETED | OUTPATIENT
Start: 2023-10-14 | End: 2023-10-14

## 2023-10-14 RX ADMIN — PANTOPRAZOLE SODIUM 40 MILLIGRAM(S): 20 TABLET, DELAYED RELEASE ORAL at 07:19

## 2023-10-14 RX ADMIN — ONDANSETRON 4 MILLIGRAM(S): 8 TABLET, FILM COATED ORAL at 05:14

## 2023-10-14 RX ADMIN — SODIUM CHLORIDE 1000 MILLILITER(S): 9 INJECTION INTRAMUSCULAR; INTRAVENOUS; SUBCUTANEOUS at 05:14

## 2023-10-14 NOTE — ED PROVIDER NOTE - NS ED ROS FT
GEN: No fever, chills, night sweats, weight loss  EYES: No vision changes, irritation, itchiness  ENT: No ear pain, congestion, sore throat  RESP: No cough or trouble breathing  CARDIOVASCULAR: No chest pain or palpitations  GI: NAUSEA, VOMITING, no diarrhea, constipation  :  No change in urine output; no dysuria, hematuria, or discharge  MSK: No joint or muscle pain  SKIN: No rashes  NEURO: No headache; no abnormal movements; no numbness or tingling  Remainder negative, except as per the HPI

## 2023-10-14 NOTE — ED ADULT TRIAGE NOTE - PATIENT ON (OXYGEN DELIVERY METHOD)
H&P 
 
Patient ID: 
Higinio Hernandez [id number] 
80 y.o. 
1946 Surgeon:  Pritesh Serrano MD 
Date of Surgery: * No surgery date entered * Procedure: Left Knee Total Arthroplasty Primary Care Physician: None Subjective: 
Higinio Hernandez is a 80 y.o. WHITE [place]  male who presents with Left Knee pain. He has history of Left Knee pain for several months ago. Symptoms worse with walking and relieved with rest. Conservative treatment consisting of  therapeutic injections into the knee has not helped. The patient  lives with their spouse. The patients goal after surgery is improved pain and function. Past Medical History:  
Diagnosis Date  Osteoarthritis Past Surgical History:  
Procedure Laterality Date  HX COLONOSCOPY Family History Problem Relation Age of Onset  Stroke Mother Social History Substance Use Topics  Smoking status: Never Smoker  Smokeless tobacco: Never Used  Alcohol use Yes Comment: rare Prior to Admission medications Medication Sig Start Date End Date Taking? Authorizing Provider SAW PALMETTO PO Take 900 mg by mouth daily. Historical Provider  
docosahexanoic acid/epa (FISH OIL PO) Take 1,400 mg by mouth daily. Historical Provider  
ibuprofen (MOTRIN) 800 mg tablet Take 800 mg by mouth. Every evening at 2200    Historical Provider Allergies Allergen Reactions  Wasp [Venom-Wasp] Other (comments) And bees REVIEW OF SYSTEMS: 
CONSTITUTIONAL: Denies fever, decreased appetite, weight loss/gain, night sweats or fatigue. HEENT: Denies vision or hearing changes. denies glasses. denies hearing aids. CARDIAC: Denies CP, palpitations, rheumatic fever, murmur, peripheral edema, carotid artery disease or syncopal episodes. RESPIRATORY: Denies dyspnea on exertion, asthma, COPD or orthopnea. GI: Denies GERD, history of GI bleed or melena, PUD, hepatitis or cirrhosis. : Denies dysuria, hematuria.  denies BPH symptoms. HEMATOLOGIC: Denies anemia or blood disorders. ENDOCRINE: Denies thyroid disease. MUSCULOSKELETAL: See HPI. NEUROLOGIC: Denies seizure, peripheral neuropathy or memory loss. PSYCH: Denies depression, anxiety or insomnia. SKIN: Denies rash or open sores. Objective: PHYSICAL EXAM 
GENERAL: No data found. EYES: PERRL. EOM intact. MOUTH:Teeth and Gums normal. NECK: Full ROM. Trachea midline. No thyromegaly or JVD. CARDIOVASCULAR: Regular rate and rhythm. No murmur or gallops. No carotid bruits. Peripheral pulses: radial 2 +, PT 2+, DP 2+ bilaterally. LUNGS: CTA bilaterally. No wheezes, rhonchi or rales. GI: positive BS. Abdomen nontender. NEUROLOGIC: Alert and oriented x 3. Bilateral equal strong had grasp and bilateral equal strong plantar flexion and dorsiflexion. GAIT: abnormal  MUSCULOSKELETAL: ROM: full range with pain. Tenderness: None. Crepitus: present. SKIN: No rash, bruising, swelling, redness or warmth. Labs:  No results found for this or any previous visit (from the past 24 hour(s)). Xray Left Knee: Joint space narrowing Assessment: 
Advanced Left Knee Osteoarthritis. Total Left Knee Arthroplasty Indicated. Patient Active Problem List  
Diagnosis Code  Snoring R06.83 Plan: 
I have advised the patient of the risks and consequences, including possible complications of performing total joint replacement, as well as not doing this operation. The patient had the opportunity to ask questions and have them answered to their satisfaction. Signed: 
TAVON Graff 7/31/2018 room air

## 2023-10-14 NOTE — ED ADULT NURSE NOTE - NSFALLUNIVINTERV_ED_ALL_ED
Bed/Stretcher in lowest position, wheels locked, appropriate side rails in place/Call bell, personal items and telephone in reach/Instruct patient to call for assistance before getting out of bed/chair/stretcher/Non-slip footwear applied when patient is off stretcher/[place] to call system/Physically safe environment - no spills, clutter or unnecessary equipment/Purposeful proactive rounding/Room/bathroom lighting operational, light cord in reach

## 2023-10-14 NOTE — ED PROVIDER NOTE - PROGRESS NOTE DETAILS
Pt reassessed. States she had an episode of nausea and gagging but has resolved. Now w/o any nausea. Requesting to get home protonix. Will give Pt reassessed. States she had an episode of nausea and gagging but has resolved. Now w/o any nausea. Requesting to get home protonix. Will give. SL Pt reassessed. States she is feeling better now, w/o nausea or vomiting. Agreeable to follow up with PCP. SL

## 2023-10-14 NOTE — ED PROVIDER NOTE - PATIENT PORTAL LINK FT
You can access the FollowMyHealth Patient Portal offered by [facility] by registering at the following website: [email or web address]. By joining [facility]’s FollowMyHealth portal, you will also be able to view your health information using other applications (apps) compatible with our system.

## 2023-10-14 NOTE — ED PROVIDER NOTE - PHYSICAL EXAMINATION
Vital signs reviewed.  CONSTITUTIONAL: NAD, awake  HEAD: Normocephalic; atraumatic  EYES: EOMI, no conjunctival injection, no scleral icterus  MOUTH/THROAT:  MMM  NECK: Trachea midline  CV: Normal S1, S2; no audible murmurs; extremities WWP  RESP: normal work of breathing; CTAB, no stridor  ABD: soft, non-distended; no TTP  : Deferred  MSK/EXT: no edema, no limited ROM  SKIN: No rashes on exposed skin surfaces  NEURO: Moves all extremities spontaneously with no focal deficits, speech is appropriate Vital signs reviewed.  CONSTITUTIONAL: NAD, awake  HEAD: Normocephalic; atraumatic  EYES: EOMI, no conjunctival injection, no scleral icterus  MOUTH/THROAT:  MMM  NECK: Trachea midline  CV: Normal S1, S2; no audible murmurs; extremities WWP  RESP: normal work of breathing; CTAB, no stridor  ABD: soft, non-distended; no TTP  : Deferred  MSK/EXT: no edema, no limited ROM  SKIN: eczematous patchy rash on L forehead, small erythematous papule w/ scaling under R eye   NEURO: Moves all extremities spontaneously with no focal deficits, speech is appropriate

## 2023-10-14 NOTE — ED PROVIDER NOTE - NSFOLLOWUPINSTRUCTIONS_ED_ALL_ED_FT
Nausea / Vomiting    Nausea is the feeling that you have to vomit. As nausea gets worse, it can lead to vomiting. Vomiting puts you at an increased risk for dehydration. Older adults and people with other diseases or a weak immune system are at higher risk for dehydration. Drink clear fluids in small but frequent amounts as tolerated. Eat bland, easy-to-digest foods in small amounts as tolerated.    SEEK IMMEDIATE MEDICAL CARE IF YOU HAVE ANY OF THE FOLLOWING SYMPTOMS: fever, inability to keep sufficient fluids down, black or bloody vomitus, black or bloody stools, lightheadedness/dizziness, chest pain, severe headache, rash, shortness of breath, cold or clammy skin, confusion, pain with urination, or any signs of dehydration.    PLEASE FOLLOW UP WITH:   Primary Care Physician within 1 week for continuity of care.

## 2023-10-14 NOTE — ED PROVIDER NOTE - CARE PLAN
1 Principal Discharge DX:	Potential for food poisoning   Principal Discharge DX:	Nausea & vomiting

## 2023-10-14 NOTE — ED PROVIDER NOTE - OBJECTIVE STATEMENT
63 yof w/ h/o migraine, GERD, HTN, HLD, chronic sinusitis p/w vomiting since last night. Pt states 63 yof w/ h/o migraine, GERD, HTN, HLD, chronic sinusitis p/w vomiting since last night. Pt states she had left over spinach salad and chicken cutlet for dinner and took a cefuroxime right after. One hour later, started having non-bloody non-bilious vomiting x6-8 episodes. Denies abd pain, 63 yof w/ h/o migraine, GERD, HTN, HLD, chronic sinusitis p/w vomiting since last night. Pt states she had left over spinach salad and chicken cutlet for dinner and took a cefuroxime right after. One hour later, started having non-bloody non-bilious vomiting x6-8 episodes. Denies abd pain, diarrhea, fever, chills, SOB, CP. Endorse daily soft BMs, last this afternoon. No hx of intraabdominal surgery. 63 yof w/ h/o migraine, GERD, HTN, HLD, chronic sinusitis p/w vomiting since last night. Pt states she had left over spinach salad and chicken cutlet for dinner and took a cefuroxime (prescribed by PCP for new skin rash) right after. One hour later, started having non-bloody non-bilious vomiting x6-8 episodes. Denies abd pain, diarrhea, fever, chills, SOB, CP. Endorse daily soft BMs, last this afternoon. No hx of intraabdominal surgery.

## 2023-10-14 NOTE — ED PROVIDER NOTE - ATTENDING CONTRIBUTION TO CARE
Multiple diagnoses were considered during patient's evaluation today. While exact etiology of symptoms is unclear, there appears to be no emergent process today that would require further emergent or inpatient management.   Exam shows benign abdomen with no tenderness, afebrile, unremarkable vital signs. Patient felt better in the ED with supportive treatment.Pt is safe for dc with outpt f/u and return instructions if symptoms worsen.

## 2023-10-14 NOTE — ED PROVIDER NOTE - CLINICAL SUMMARY MEDICAL DECISION MAKING FREE TEXT BOX
63 yof w/ h/o migraine, GERD, HTN, HLD, chronic sinusitis p/w vomitingx6-8 after dinner and taking a new abs. VS wnl. Exam benign. Consider SBO but has regular BMs vs food poisoning but no diarrhea vs allergic rx but cefuroxime not sulfa drug and no rash/hive reported. Will obtain labs and give IVF. Will reassess. Dispo likely home. SL

## 2023-10-14 NOTE — ED PROVIDER NOTE - PROVIDER TOKENS
FREE:[LAST:[Anthony],FIRST:[Kee],PHONE:[phone]],FAX:[(   )    -],FOLLOWUP:[4-6 Days],ESTABLISHEDPATIENT:[T]]

## 2023-10-14 NOTE — ED ADULT NURSE NOTE - OBJECTIVE STATEMENT
Pt is a 63y F PMH asthma, HTN, GERD, migraines c/o sudden onset nausea, vomiting earlier tonight. Pt states she had seen doctor for left under eye rash today, started cefuroxime tonight after dinner. Pt states she ate chicken cutlet and spinach salad at approx [id number], took medication a half hour later, and began suddenly vomiting and hour later. Pt states she had NBNB emesis x 6-8 times. Pt last episode of emesis at 12AM. Pt denies abd pain, diarrhea, fever, chills, cough, sob, cp. Pt is well appearing, A&Ox3, neuro status intact, breathing unlabored and spontaneous, full strength and ROM of all extremities. Pt resting in stretcher, bed in lowest position, family at bedside, aware of plan of care. Comfort and safety measures maintained.

## 2023-10-15 NOTE — ED POST DISCHARGE NOTE - RESULT SUMMARY
abnormal UA. Pt in ED c/o n/v. Also noted to be on cefuroxime for rash. No UCx sent. abnormal UA. Pt in ED c/o n/v, no urinary symptoms. Also noted to be on cefuroxime for rash. No UCx sent.

## 2023-10-15 NOTE — ED POST DISCHARGE NOTE - DETAILS
10/15 Spoke with patient regarding results. Reports she has urologist, has had normal cystoscopy, advised f/u with her Urologist for repeat urine test and further evaluation. Patient understands, appreciative of call. - Cornelia Szymanski PA-C

## 2023-10-23 ENCOUNTER — APPOINTMENT (OUTPATIENT)
Dept: GASTROENTEROLOGY | Facility: CLINIC | Age: 63
End: 2023-10-23
Payer: MEDICARE

## 2023-10-23 VITALS
SYSTOLIC BLOOD PRESSURE: 128 MMHG | HEIGHT: 64 IN | RESPIRATION RATE: 14 BRPM | OXYGEN SATURATION: 98 % | WEIGHT: 146 LBS | DIASTOLIC BLOOD PRESSURE: 80 MMHG | TEMPERATURE: 98 F | BODY MASS INDEX: 24.92 KG/M2 | HEART RATE: 91 BPM

## 2023-10-23 DIAGNOSIS — Z86.39 PERSONAL HISTORY OF OTHER ENDOCRINE, NUTRITIONAL AND METABOLIC DISEASE: ICD-10-CM

## 2023-10-23 DIAGNOSIS — Z86.69 PERSONAL HISTORY OF OTHER DISEASES OF THE NERVOUS SYSTEM AND SENSE ORGANS: ICD-10-CM

## 2023-10-23 DIAGNOSIS — R73.03 PREDIABETES.: ICD-10-CM

## 2023-10-23 DIAGNOSIS — Z86.79 PERSONAL HISTORY OF OTHER DISEASES OF THE CIRCULATORY SYSTEM: ICD-10-CM

## 2023-10-23 PROCEDURE — 99204 OFFICE O/P NEW MOD 45 MIN: CPT

## 2023-10-24 ENCOUNTER — APPOINTMENT (OUTPATIENT)
Dept: NEUROLOGY | Facility: CLINIC | Age: 63
End: 2023-10-24
Payer: MEDICARE

## 2023-10-24 PROCEDURE — 96365 THER/PROPH/DIAG IV INF INIT: CPT

## 2023-11-20 ENCOUNTER — APPOINTMENT (OUTPATIENT)
Dept: PAIN MANAGEMENT | Facility: CLINIC | Age: 63
End: 2023-11-20

## 2023-11-21 ENCOUNTER — APPOINTMENT (OUTPATIENT)
Dept: PAIN MANAGEMENT | Facility: CLINIC | Age: 63
End: 2023-11-21
Payer: MEDICARE

## 2023-11-21 VITALS
HEART RATE: 73 BPM | HEIGHT: 64 IN | BODY MASS INDEX: 24.41 KG/M2 | SYSTOLIC BLOOD PRESSURE: 130 MMHG | DIASTOLIC BLOOD PRESSURE: 84 MMHG | WEIGHT: 143 LBS

## 2023-11-21 PROCEDURE — 64615 CHEMODENERV MUSC MIGRAINE: CPT

## 2023-12-13 ENCOUNTER — APPOINTMENT (OUTPATIENT)
Dept: PHYSICAL MEDICINE AND REHAB | Facility: CLINIC | Age: 63
End: 2023-12-13
Payer: MEDICARE

## 2023-12-13 ENCOUNTER — NON-APPOINTMENT (OUTPATIENT)
Age: 63
End: 2023-12-13

## 2023-12-13 PROCEDURE — 64405 NJX AA&/STRD GR OCPL NRV: CPT | Mod: RT

## 2023-12-13 PROCEDURE — 99213 OFFICE O/P EST LOW 20 MIN: CPT | Mod: 25

## 2023-12-13 NOTE — ASSESSMENT
[FreeTextEntry1] : 63 year old female with occipital neuralgia.  She did tolerate right sided occipital nerve block without complaint or complication.  She will return to see me for her left occipital neuralgia in a few weeks.

## 2023-12-13 NOTE — PROCEDURE
[de-identified] :  The patient agreed to proceed with occipital nerve block. The right occipital region was palpated and cleansed with betadine in sterile fashion. Using a 27 gauge 1.5 inch needle, a total of 8cc of 0.25% Bupivacaine was injected in a fan-like distribution after negative aspiration. The needle was removed and hemostasis was achieved. The patient tolerated the procedure well without complaint or complication.

## 2023-12-13 NOTE — HISTORY OF PRESENT ILLNESS
[FreeTextEntry1] : Patient returns with a recurrence of her occipital neuralgia on both sides.  She states that her right side is worse than her left side.

## 2024-01-02 ENCOUNTER — TRANSCRIPTION ENCOUNTER (OUTPATIENT)
Age: 64
End: 2024-01-02

## 2024-01-04 ENCOUNTER — RESULT REVIEW (OUTPATIENT)
Age: 64
End: 2024-01-04

## 2024-01-04 ENCOUNTER — APPOINTMENT (OUTPATIENT)
Dept: GASTROENTEROLOGY | Facility: AMBULATORY SURGERY CENTER | Age: 64
End: 2024-01-04
Payer: MEDICARE

## 2024-01-04 PROCEDURE — 43239 EGD BIOPSY SINGLE/MULTIPLE: CPT

## 2024-01-09 ENCOUNTER — APPOINTMENT (OUTPATIENT)
Dept: PHYSICAL MEDICINE AND REHAB | Facility: CLINIC | Age: 64
End: 2024-01-09
Payer: MEDICARE

## 2024-01-09 PROCEDURE — 99213 OFFICE O/P EST LOW 20 MIN: CPT | Mod: 25

## 2024-01-09 PROCEDURE — 64405 NJX AA&/STRD GR OCPL NRV: CPT | Mod: LT

## 2024-01-11 ENCOUNTER — APPOINTMENT (OUTPATIENT)
Dept: OTOLARYNGOLOGY | Facility: CLINIC | Age: 64
End: 2024-01-11
Payer: MEDICARE

## 2024-01-11 VITALS
BODY MASS INDEX: 24.41 KG/M2 | HEIGHT: 64 IN | SYSTOLIC BLOOD PRESSURE: 108 MMHG | TEMPERATURE: 98.2 F | WEIGHT: 143 LBS | DIASTOLIC BLOOD PRESSURE: 60 MMHG

## 2024-01-11 DIAGNOSIS — J32.9 CHRONIC SINUSITIS, UNSPECIFIED: ICD-10-CM

## 2024-01-11 PROCEDURE — 31231 NASAL ENDOSCOPY DX: CPT

## 2024-01-11 PROCEDURE — 99214 OFFICE O/P EST MOD 30 MIN: CPT | Mod: 25

## 2024-01-11 NOTE — HISTORY OF PRESENT ILLNESS
[de-identified] : 63 year old female s/p L maxillary antrostomy and total ethmoidectomy 7/10/23 presents for follow up [facility] 10/04/23 Overall doing well from sinuses Using saline rinses 2x a day Minimal nasal congestion and occasional dried blood noted since weather changes Denies recent anterior rhinorrhea, PND, sinus pain/pressure  No recent fevers or sinus infections  States migraines has worsen since winter started  COVID + about a month ago   OPERATION: 1. Left endoscopic maxillary antrostomy with tissue removal 2. Left endoscopic total ethmoidectomy 3. Use of stereotactic image navigation extradural

## 2024-01-17 ENCOUNTER — APPOINTMENT (OUTPATIENT)
Dept: NEUROLOGY | Facility: CLINIC | Age: 64
End: 2024-01-17

## 2024-01-18 RX ORDER — KETOROLAC TROMETHAMINE 10 MG/1
10 TABLET, FILM COATED ORAL
Qty: 12 | Refills: 0 | Status: ACTIVE | COMMUNITY
Start: 2019-03-21 | End: 1900-01-01

## 2024-02-06 ENCOUNTER — APPOINTMENT (OUTPATIENT)
Dept: GASTROENTEROLOGY | Facility: CLINIC | Age: 64
End: 2024-02-06
Payer: MEDICARE

## 2024-02-06 VITALS
DIASTOLIC BLOOD PRESSURE: 80 MMHG | TEMPERATURE: 98 F | BODY MASS INDEX: 24.75 KG/M2 | SYSTOLIC BLOOD PRESSURE: 128 MMHG | RESPIRATION RATE: 14 BRPM | OXYGEN SATURATION: 99 % | WEIGHT: 145 LBS | HEIGHT: 64 IN | HEART RATE: 78 BPM

## 2024-02-06 DIAGNOSIS — K21.9 GASTRO-ESOPHAGEAL REFLUX DISEASE W/OUT ESOPHAGITIS: ICD-10-CM

## 2024-02-06 DIAGNOSIS — G43.719 CHRONIC MIGRAINE W/OUT AURA, INTRACTABLE, W/OUT STATUS MIGRAINOSUS: ICD-10-CM

## 2024-02-06 DIAGNOSIS — R11.2 NAUSEA WITH VOMITING, UNSPECIFIED: ICD-10-CM

## 2024-02-06 PROCEDURE — 99214 OFFICE O/P EST MOD 30 MIN: CPT

## 2024-02-06 RX ORDER — SODIUM SULFATE, POTASSIUM SULFATE AND MAGNESIUM SULFATE 1.6; 3.13; 17.5 G/177ML; G/177ML; G/177ML
17.5-3.13-1.6 SOLUTION ORAL TWICE DAILY
Qty: 2 | Refills: 0 | Status: ACTIVE | COMMUNITY
Start: 2024-02-06 | End: 1900-01-01

## 2024-02-06 NOTE — ASSESSMENT
[FreeTextEntry1] : Continue pantoprazole   BRUNILDA LEES was advised to undergo colonoscopy to which she agreed. The procedure will be performed in [facility] with the assistance of an anesthesiologist. The patient was given a Suprep preparation prescription and understood the  procedure as it was explained to her. She was given a booklet distributed by the [employer]  [employer] explaining the procedure in detail and she understood the risks of the procedure not limited to infection, bleeding, perforation or non- diagnosis of colorectal cancer. She was advised that she could not drive home, if she chooses to  receive sedation.  Further diagnostic and treatment recommendations will be based upon the procedure and any biopsies, if they are taken.  Thank you for allowing me to participate in this [facility] care.  , Best personal regards -- Don   I spent 32 minutes with the patient and answered all of her questions

## 2024-02-06 NOTE — CONSULT LETTER
[Dear  ___] : Dear  [unfilled], [Consult Letter:] : I had the pleasure of evaluating your patient, [unfilled]. [( Thank you for referring [unfilled] for consultation for _____ )] : Thank you for referring [unfilled] for consultation for [unfilled] [Please see my note below.] : Please see my note below. [Consult Closing:] : Thank you very much for allowing me to participate in the care of this patient.  If you have any questions, please do not hesitate to contact me. [Sincerely,] : Sincerely, [FreeTextEntry3] : Tom Diaz MD  Gastroenterology [facility]

## 2024-02-06 NOTE — HISTORY OF PRESENT ILLNESS
[FreeTextEntry1] : Recent endoscopy revealed mild gastritis and a small hiatal hernia.  She was placed on pantoprazole with marked improvement in his symptoms.  Routine biopsies of the stomach were negative for Helicobacter pylori.  Her last colonoscopy was over 10 years ago and she was referred to get a screening colonoscopy.  She denies a family history of colon cancer

## 2024-02-23 ENCOUNTER — APPOINTMENT (OUTPATIENT)
Dept: PAIN MANAGEMENT | Facility: CLINIC | Age: 64
End: 2024-02-23
Payer: MEDICARE

## 2024-02-23 VITALS
SYSTOLIC BLOOD PRESSURE: 107 MMHG | HEART RATE: 75 BPM | HEIGHT: 64 IN | WEIGHT: 145 LBS | DIASTOLIC BLOOD PRESSURE: 73 MMHG | BODY MASS INDEX: 24.75 KG/M2

## 2024-02-23 PROCEDURE — 64615 CHEMODENERV MUSC MIGRAINE: CPT

## 2024-02-23 NOTE — HISTORY OF PRESENT ILLNESS
[FreeTextEntry1] : Pt is here today for Botox injections for migraine prevention. Pt benefits from Botox injections. Does experience a wear off effect with an increase in migraine frequency the last 2-3 weeks before the next injections. Vyepti infusion is late  and patient has had an increase in migraine .  Discussed potential adverse effects of Botox injections including bleeding , bruising , infection , eye lidid , eye brow ptosis. Pt advised to avoid laying flat for 4 hours , do not exercise strenuously today and do not apply make- up or lotion to injection site. Pt verbalized understanding and wishes to proceed.    Ubrelvy has been helpful to abort migraine .   [Headache] : headache [Chronic Headache] : chronic headache [Dizziness] : dizziness [Nausea] : nausea [Vomiting] : Vomiting [Photophobia] : photophobia [Phonophobia] : phonophobia [Neck Pain] : neck pain [> 15 days per month] : > 15 days per month

## 2024-03-03 ENCOUNTER — TRANSCRIPTION ENCOUNTER (OUTPATIENT)
Age: 64
End: 2024-03-03

## 2024-03-05 ENCOUNTER — APPOINTMENT (OUTPATIENT)
Dept: NEUROLOGY | Facility: CLINIC | Age: 64
End: 2024-03-05
Payer: MEDICARE

## 2024-03-05 VITALS — DIASTOLIC BLOOD PRESSURE: 70 MMHG | RESPIRATION RATE: 18 BRPM | SYSTOLIC BLOOD PRESSURE: 104 MMHG | HEART RATE: 84 BPM

## 2024-03-05 VITALS — DIASTOLIC BLOOD PRESSURE: 72 MMHG | RESPIRATION RATE: 18 BRPM | HEART RATE: 80 BPM | SYSTOLIC BLOOD PRESSURE: 119 MMHG

## 2024-03-05 PROCEDURE — 96365 THER/PROPH/DIAG IV INF INIT: CPT

## 2024-03-05 NOTE — HISTORY OF PRESENT ILLNESS
[Yes] : Yes [4] : 4 [Start Time: ___] : Medication Start Time: [unfilled] [22g] : 22g [Medication Name: ___] : Medication Name: [unfilled] [de-identified] : Headache [IV discontinued. Intact. No signs or symptoms of IV complications noted. Time: ___] : IV discontinued. Intact. No signs or symptoms of IV complications noted. Time: [unfilled] [End Time: ___] : Medication End Time: [unfilled] [Patient left unit in no acute distress] : Patient left unit in no acute distress [Patient  instructed to seek medical attention with signs and symptoms of adverse effects] : Patient  instructed to seek medical attention with signs and symptoms of adverse effects [Medications administered as ordered and tolerated well.] : Medications administered as ordered and tolerated well. [de-identified] : R hand [de-identified] : Generic: eptinezumab-JJMR 1mg Dose: 300 mg Infusion Rate: 200mL/hr in 100mL 0.9% NS NDC #: 90621-663-58 Lot #: [id number] Exp: 04/2026 Was this medication Buy & Bill: No Specialty Pharmacy: [facility] Dx: Migraine MD Prescriber: Dr. Hitchcock  CPT: [id number] Next Appointment: TBD [[place] [address]] : [phone]

## 2024-03-15 RX ORDER — UBROGEPANT 100 MG/1
100 TABLET ORAL
Qty: 16 | Refills: 4 | Status: ACTIVE | COMMUNITY
Start: 2023-05-05 | End: 1900-01-01

## 2024-03-27 ENCOUNTER — APPOINTMENT (OUTPATIENT)
Dept: PHYSICAL MEDICINE AND REHAB | Facility: CLINIC | Age: 64
End: 2024-03-27
Payer: MEDICARE

## 2024-03-27 PROCEDURE — 64405 NJX AA&/STRD GR OCPL NRV: CPT | Mod: LT

## 2024-03-27 PROCEDURE — 99213 OFFICE O/P EST LOW 20 MIN: CPT | Mod: 25

## 2024-03-27 NOTE — ASSESSMENT
[FreeTextEntry1] : 63 year old female with occipital neuralgia.  She did tolerate trigger point injections without complaint or complication.  She will return to see me in 2 weeks for her right occipital nerve block.

## 2024-03-27 NOTE — HISTORY OF PRESENT ILLNESS
[FreeTextEntry1] : Patient returns after being seen in January.  She is here for left occipital nerve block.  She has also been experiencing headaches and it took her 7 weeks to get her Vyepty.

## 2024-03-27 NOTE — PROCEDURE
[de-identified] :  The patient agreed to proceed with left occipital nerve block. The left occipital region was palpated and cleansed with betadine in sterile fashion. Using a 27 gauge 1.5 inch needle, a total of 8cc of 0.25% Bupivacaine was injected in a fan-like distribution after negative aspiration. The needle was removed and hemostasis was achieved. The patient tolerated the procedure well without complaint or complication.

## 2024-04-09 ENCOUNTER — APPOINTMENT (OUTPATIENT)
Dept: GASTROENTEROLOGY | Facility: AMBULATORY SURGERY CENTER | Age: 64
End: 2024-04-09
Payer: MEDICARE

## 2024-04-09 PROCEDURE — 45378 DIAGNOSTIC COLONOSCOPY: CPT

## 2024-04-12 ENCOUNTER — APPOINTMENT (OUTPATIENT)
Dept: PHYSICAL MEDICINE AND REHAB | Facility: CLINIC | Age: 64
End: 2024-04-12
Payer: MEDICARE

## 2024-04-12 DIAGNOSIS — R51.9 HEADACHE, UNSPECIFIED: ICD-10-CM

## 2024-04-12 DIAGNOSIS — M54.81 OCCIPITAL NEURALGIA: ICD-10-CM

## 2024-04-12 PROCEDURE — 64405 NJX AA&/STRD GR OCPL NRV: CPT | Mod: RT

## 2024-04-12 PROCEDURE — 99213 OFFICE O/P EST LOW 20 MIN: CPT | Mod: 25

## 2024-04-12 NOTE — ASSESSMENT
[FreeTextEntry1] : 64 year old female with occipital neuralgia.  She did tolerate right occipital nerve block without complaint or complication.  She will return to see me at the earliest sign that her pain returns for further injection therapy as necessary.

## 2024-04-12 NOTE — PROCEDURE
[de-identified] :  The patient agreed to proceed with occipital nerve block. The right occipital region was palpated and cleansed with betadine in sterile fashion. Using a 27 gauge 1.5 inch needle, a total of 8cc of 0.25% Bupivacaine was injected in a fan-like distribution after negative aspiration. The needle was removed and hemostasis was achieved. The patient tolerated the procedure well without complaint or complication.

## 2024-04-12 NOTE — HISTORY OF PRESENT ILLNESS
[FreeTextEntry1] : Patient returns today for right occipital nerve block.  She is feeling improved following her left occipital nerve block 2 weeks ago.

## 2024-04-25 NOTE — H&P PST ADULT - NSANTHAGERD_ENT_A_CORE
----- Message from Rylie Zarina Deena Yes

## 2024-05-06 ENCOUNTER — TRANSCRIPTION ENCOUNTER (OUTPATIENT)
Age: 64
End: 2024-05-06

## 2024-05-07 RX ORDER — GABAPENTIN 100 MG/1
100 CAPSULE ORAL 3 TIMES DAILY
Qty: 180 | Refills: 2 | Status: ACTIVE | COMMUNITY
Start: 2018-03-06 | End: 1900-01-01

## 2024-05-10 ENCOUNTER — APPOINTMENT (OUTPATIENT)
Dept: GASTROENTEROLOGY | Facility: CLINIC | Age: 64
End: 2024-05-10
Payer: MEDICARE

## 2024-05-10 VITALS
HEART RATE: 80 BPM | HEIGHT: 64 IN | TEMPERATURE: 97 F | OXYGEN SATURATION: 99 % | DIASTOLIC BLOOD PRESSURE: 78 MMHG | BODY MASS INDEX: 24.75 KG/M2 | RESPIRATION RATE: 14 BRPM | SYSTOLIC BLOOD PRESSURE: 120 MMHG | WEIGHT: 145 LBS

## 2024-05-10 DIAGNOSIS — Z12.11 ENCOUNTER FOR SCREENING FOR MALIGNANT NEOPLASM OF COLON: ICD-10-CM

## 2024-05-10 PROCEDURE — 99212 OFFICE O/P EST SF 10 MIN: CPT

## 2024-05-10 NOTE — CONSULT LETTER
[Dear  ___] : Dear  [unfilled], [Consult Letter:] : I had the pleasure of evaluating your patient, [unfilled]. [( Thank you for referring [unfilled] for consultation for _____ )] : Thank you for referring [unfilled] for consultation for [unfilled] [Please see my note below.] : Please see my note below. [Consult Closing:] : Thank you very much for allowing me to participate in the care of this patient.  If you have any questions, please do not hesitate to contact me. [Sincerely,] : Sincerely, [FreeTextEntry3] : Tom Diaz MD  Gastroenterology [facility]

## 2024-05-10 NOTE — HISTORY OF PRESENT ILLNESS
[FreeTextEntry1] : Recent colonoscopy revealed normal mucosa and small internal hemorrhoids.  She is feeling well has no complaints

## 2024-05-10 NOTE — ASSESSMENT
[FreeTextEntry1] : Office follow-up if needed   I spent 15 minutes with the patient and answered all of her questions

## 2024-05-18 NOTE — HISTORY OF PRESENT ILLNESS
[FreeTextEntry1] : Pt notes that she had been behind for 2 months on Botox with Dr. Garsia and had been overall doing worse with her headaches.  Did have some events associated with vertigo and with crushing unilateral pain.\par Did use the naratriptan with some success but getting fairly high recurrentce.\par Did use a bridge with triptan but leary of using steroids.\par Has also been concerned re: the use of other new medicaitons.
Resulted

## 2024-05-20 ENCOUNTER — NON-APPOINTMENT (OUTPATIENT)
Age: 64
End: 2024-05-20

## 2024-05-21 ENCOUNTER — APPOINTMENT (OUTPATIENT)
Dept: PAIN MANAGEMENT | Facility: CLINIC | Age: 64
End: 2024-05-21
Payer: MEDICARE

## 2024-05-21 VITALS
BODY MASS INDEX: 24.59 KG/M2 | DIASTOLIC BLOOD PRESSURE: 70 MMHG | SYSTOLIC BLOOD PRESSURE: 116 MMHG | WEIGHT: 144 LBS | HEART RATE: 74 BPM | HEIGHT: 64 IN

## 2024-05-21 PROCEDURE — 64615 CHEMODENERV MUSC MIGRAINE: CPT

## 2024-05-31 RX ORDER — EPTINEZUMAB-JJMR 100 MG/ML
100 INJECTION INTRAVENOUS
Qty: 3 | Refills: 3 | Status: ACTIVE | COMMUNITY
Start: 2023-05-25 | End: 1900-01-01

## 2024-06-04 ENCOUNTER — APPOINTMENT (OUTPATIENT)
Dept: NEUROLOGY | Facility: CLINIC | Age: 64
End: 2024-06-04

## 2024-06-04 VITALS — SYSTOLIC BLOOD PRESSURE: 129 MMHG | DIASTOLIC BLOOD PRESSURE: 79 MMHG | HEART RATE: 74 BPM | RESPIRATION RATE: 16 BRPM

## 2024-06-04 PROCEDURE — 96365 THER/PROPH/DIAG IV INF INIT: CPT

## 2024-06-16 RX ORDER — MEMANTINE HYDROCHLORIDE 10 MG/1
10 TABLET, FILM COATED ORAL
Qty: 60 | Refills: 2 | Status: ACTIVE | COMMUNITY
Start: 2019-08-01 | End: 1900-01-01

## 2024-06-18 NOTE — HISTORY OF PRESENT ILLNESS
[Denies] : Denies [Right upper extremity] : Right upper extremity [22g] : 22g [Start Time: ___] : Medication Start Time: [unfilled] [End Time: ___] : Medication End Time: [unfilled] [Medication Name: ___] : Medication Name: [unfilled] [Total Amount Administered: ___] : Total Amount Administered: [unfilled] [IV discontinued. Intact. No signs or symptoms of IV complications noted. Time: ___] : IV discontinued. Intact. No signs or symptoms of IV complications noted. Time: [unfilled] [Patient  instructed to seek medical attention with signs and symptoms of adverse effects] : Patient  instructed to seek medical attention with signs and symptoms of adverse effects [Patient left unit in no acute distress] : Patient left unit in no acute distress [Medications administered as ordered and tolerated well.] : Medications administered as ordered and tolerated well. [de-identified] : [id number] [de-identified] : Patient tolerated well

## 2024-07-24 ENCOUNTER — EMERGENCY (EMERGENCY)
Facility: HOSPITAL | Age: 64
LOS: 1 days | Discharge: HOME | End: 2024-07-24
Attending: EMERGENCY MEDICINE
Payer: MEDICARE

## 2024-07-24 ENCOUNTER — NON-APPOINTMENT (OUTPATIENT)
Age: 64
End: 2024-07-24

## 2024-07-24 VITALS
SYSTOLIC BLOOD PRESSURE: 131 MMHG | DIASTOLIC BLOOD PRESSURE: 81 MMHG | OXYGEN SATURATION: 98 % | HEART RATE: 83 BPM | RESPIRATION RATE: 20 BRPM | TEMPERATURE: 98 F

## 2024-07-24 DIAGNOSIS — Z90.89 ACQUIRED ABSENCE OF OTHER ORGANS: Chronic | ICD-10-CM

## 2024-07-24 DIAGNOSIS — Z98.890 OTHER SPECIFIED POSTPROCEDURAL STATES: Chronic | ICD-10-CM

## 2024-07-24 PROCEDURE — 99284 EMERGENCY DEPT VISIT MOD MDM: CPT | Mod: GC

## 2024-07-24 RX ORDER — METHYLPREDNISOLONE 4 MG/1
4 TABLET ORAL
Qty: 1 | Refills: 0 | Status: ACTIVE | COMMUNITY
Start: 2024-07-24 | End: 1900-01-01

## 2024-07-25 VITALS
TEMPERATURE: 98 F | OXYGEN SATURATION: 97 % | HEART RATE: 78 BPM | RESPIRATION RATE: 20 BRPM | DIASTOLIC BLOOD PRESSURE: 80 MMHG | SYSTOLIC BLOOD PRESSURE: 122 MMHG

## 2024-07-25 LAB
ALBUMIN SERPL ELPH-MCNC: 4.1 G/DL — SIGNIFICANT CHANGE UP (ref 3.3–5)
ALP SERPL-CCNC: 48 U/L — SIGNIFICANT CHANGE UP (ref 40–120)
ALT FLD-CCNC: 22 U/L — SIGNIFICANT CHANGE UP (ref 10–45)
ANION GAP SERPL CALC-SCNC: 14 MMOL/L — SIGNIFICANT CHANGE UP (ref 5–17)
AST SERPL-CCNC: 18 U/L — SIGNIFICANT CHANGE UP (ref 10–40)
BASOPHILS # BLD AUTO: 0.02 K/UL — SIGNIFICANT CHANGE UP (ref 0–0.2)
BASOPHILS NFR BLD AUTO: 0.2 % — SIGNIFICANT CHANGE UP (ref 0–2)
BILIRUB SERPL-MCNC: 0.4 MG/DL — SIGNIFICANT CHANGE UP (ref 0.2–1.2)
BUN SERPL-MCNC: 14 MG/DL — SIGNIFICANT CHANGE UP (ref 7–23)
CALCIUM SERPL-MCNC: 10.6 MG/DL — HIGH (ref 8.4–10.5)
CHLORIDE SERPL-SCNC: 104 MMOL/L — SIGNIFICANT CHANGE UP (ref 96–108)
CO2 SERPL-SCNC: 18 MMOL/L — LOW (ref 22–31)
CREAT SERPL-MCNC: 0.6 MG/DL — SIGNIFICANT CHANGE UP (ref 0.5–1.3)
EGFR: 101 ML/MIN/1.73M2 — SIGNIFICANT CHANGE UP
EGFR: 101 ML/MIN/1.73M2 — SIGNIFICANT CHANGE UP
EOSINOPHIL # BLD AUTO: 0.01 K/UL — SIGNIFICANT CHANGE UP (ref 0–0.5)
EOSINOPHIL NFR BLD AUTO: 0.1 % — SIGNIFICANT CHANGE UP (ref 0–6)
GLUCOSE SERPL-MCNC: 136 MG/DL — HIGH (ref 70–99)
HCT VFR BLD CALC: 44.7 % — SIGNIFICANT CHANGE UP (ref 34.5–45)
HGB BLD-MCNC: 15.1 G/DL — SIGNIFICANT CHANGE UP (ref 11.5–15.5)
IMM GRANULOCYTES NFR BLD AUTO: 0.3 % — SIGNIFICANT CHANGE UP (ref 0–0.9)
LYMPHOCYTES # BLD AUTO: 1.62 K/UL — SIGNIFICANT CHANGE UP (ref 1–3.3)
LYMPHOCYTES # BLD AUTO: 18.5 % — SIGNIFICANT CHANGE UP (ref 13–44)
MCHC RBC-ENTMCNC: 31.5 PG — SIGNIFICANT CHANGE UP (ref 27–34)
MCHC RBC-ENTMCNC: 33.8 GM/DL — SIGNIFICANT CHANGE UP (ref 32–36)
MCV RBC AUTO: 93.1 FL — SIGNIFICANT CHANGE UP (ref 80–100)
MONOCYTES # BLD AUTO: 0.23 K/UL — SIGNIFICANT CHANGE UP (ref 0–0.9)
MONOCYTES NFR BLD AUTO: 2.6 % — SIGNIFICANT CHANGE UP (ref 2–14)
NEUTROPHILS # BLD AUTO: 6.83 K/UL — SIGNIFICANT CHANGE UP (ref 1.8–7.4)
NEUTROPHILS NFR BLD AUTO: 78.3 % — HIGH (ref 43–77)
NRBC # BLD: 0 /100 WBCS — SIGNIFICANT CHANGE UP (ref 0–0)
NRBC BLD-RTO: 0 /100 WBCS — SIGNIFICANT CHANGE UP (ref 0–0)
PLATELET # BLD AUTO: 227 K/UL — SIGNIFICANT CHANGE UP (ref 150–400)
POTASSIUM SERPL-MCNC: 4.3 MMOL/L — SIGNIFICANT CHANGE UP (ref 3.5–5.3)
POTASSIUM SERPL-SCNC: 4.3 MMOL/L — SIGNIFICANT CHANGE UP (ref 3.5–5.3)
PROT SERPL-MCNC: 7.5 G/DL — SIGNIFICANT CHANGE UP (ref 6–8.3)
RBC # BLD: 4.8 M/UL — SIGNIFICANT CHANGE UP (ref 3.8–5.2)
RBC # FLD: 12.4 % — SIGNIFICANT CHANGE UP (ref 10.3–14.5)
SODIUM SERPL-SCNC: 136 MMOL/L — SIGNIFICANT CHANGE UP (ref 135–145)
WBC # BLD: 8.74 K/UL — SIGNIFICANT CHANGE UP (ref 3.8–10.5)
WBC # FLD AUTO: 8.74 K/UL — SIGNIFICANT CHANGE UP (ref 3.8–10.5)

## 2024-07-25 PROCEDURE — 96374 THER/PROPH/DIAG INJ IV PUSH: CPT

## 2024-07-25 PROCEDURE — 96375 TX/PRO/DX INJ NEW DRUG ADDON: CPT

## 2024-07-25 PROCEDURE — 85025 COMPLETE CBC W/AUTO DIFF WBC: CPT

## 2024-07-25 PROCEDURE — 80053 COMPREHEN METABOLIC PANEL: CPT

## 2024-07-25 PROCEDURE — 99284 EMERGENCY DEPT VISIT MOD MDM: CPT | Mod: 25

## 2024-07-25 RX ORDER — KETOROLAC TROMETHAMINE 30 MG/ML
30 INJECTION, SOLUTION INTRAMUSCULAR; INTRAVENOUS ONCE
Refills: 0 | Status: DISCONTINUED | OUTPATIENT
Start: 2024-07-25 | End: 2024-07-25

## 2024-07-25 RX ORDER — MAGNESIUM SULFATE 500 MG/ML
1 SYRINGE (ML) INJECTION ONCE
Refills: 0 | Status: COMPLETED | OUTPATIENT
Start: 2024-07-25 | End: 2024-07-25

## 2024-07-25 RX ORDER — METOCLOPRAMIDE HCL 10 MG
10 TABLET ORAL ONCE
Refills: 0 | Status: COMPLETED | OUTPATIENT
Start: 2024-07-25 | End: 2024-07-25

## 2024-07-25 RX ORDER — ACETAMINOPHEN 500 MG/5ML
1000 LIQUID (ML) ORAL ONCE
Refills: 0 | Status: ACTIVE | OUTPATIENT
Start: 2024-07-25

## 2024-07-25 RX ADMIN — Medication 100 GRAM(S): at 02:27

## 2024-07-25 RX ADMIN — Medication 10 MILLIGRAM(S): at 02:26

## 2024-07-25 RX ADMIN — Medication 1000 MILLILITER(S): at 03:49

## 2024-07-25 RX ADMIN — KETOROLAC TROMETHAMINE 30 MILLIGRAM(S): 30 INJECTION, SOLUTION INTRAMUSCULAR; INTRAVENOUS at 02:24

## 2024-07-26 ENCOUNTER — APPOINTMENT (OUTPATIENT)
Dept: PHYSICAL MEDICINE AND REHAB | Facility: CLINIC | Age: 64
End: 2024-07-26
Payer: MEDICARE

## 2024-07-26 DIAGNOSIS — M54.81 OCCIPITAL NEURALGIA: ICD-10-CM

## 2024-07-26 PROCEDURE — 64405 NJX AA&/STRD GR OCPL NRV: CPT | Mod: LT

## 2024-07-26 NOTE — HISTORY OF PRESENT ILLNESS
[FreeTextEntry1] : Patient returns after being seen in April.  She reports having a uterine polyp that will need to be surgically removed.  She also developed severe migraine a few days ago treated in the ER.  She is here for left occipital nerve block.

## 2024-07-26 NOTE — ASSESSMENT
[FreeTextEntry1] : 64 year old female with occipital neuralgia.  She did tolerate the left occipital nerve block without complaint or complication.  She will return to see me in 2 weeks for the right occipital nerve block.

## 2024-07-26 NOTE — PROCEDURE
[de-identified] :  The patient agreed to proceed with occipital nerve block. The left occipital region was palpated and cleansed with betadine in sterile fashion. Using a 27 gauge 1.5 inch needle, a total of 8cc of 0.25% Bupivacaine was injected in a fan-like distribution after negative aspiration. The needle was removed and hemostasis was achieved. The patient tolerated the procedure well without complaint or complication.

## 2024-08-09 ENCOUNTER — RX RENEWAL (OUTPATIENT)
Age: 64
End: 2024-08-09

## 2024-08-09 ENCOUNTER — APPOINTMENT (OUTPATIENT)
Dept: PHYSICAL MEDICINE AND REHAB | Facility: CLINIC | Age: 64
End: 2024-08-09

## 2024-08-09 PROCEDURE — 64405 NJX AA&/STRD GR OCPL NRV: CPT | Mod: RT

## 2024-08-09 NOTE — PROCEDURE
[de-identified] :  The patient agreed to proceed with occipital nerve block. The right occipital region was palpated and cleansed with betadine in sterile fashion. Using a 27 gauge 1.5 inch needle, a total of 8cc of 0.25% Bupivacaine was injected in a fan-like distribution after negative aspiration. The needle was removed and hemostasis was achieved. The patient tolerated the procedure well without complaint or complication.

## 2024-08-09 NOTE — HISTORY OF PRESENT ILLNESS
[FreeTextEntry1] : Patient returns after being seen a couple of weeks ago for her left occipital nerve block.  Today she returns for right occipital nerve block.

## 2024-08-09 NOTE — ASSESSMENT
[FreeTextEntry1] : 64 year old female with occipital neuralgia.  She did tolerate right occipital nerve block without complaint or complication.  She will return to see me as needed for further injection therapy.

## 2024-08-22 ENCOUNTER — APPOINTMENT (OUTPATIENT)
Dept: PAIN MANAGEMENT | Facility: CLINIC | Age: 64
End: 2024-08-22
Payer: MEDICARE

## 2024-08-22 VITALS
HEIGHT: 64 IN | DIASTOLIC BLOOD PRESSURE: 71 MMHG | BODY MASS INDEX: 24.59 KG/M2 | WEIGHT: 144 LBS | SYSTOLIC BLOOD PRESSURE: 107 MMHG | HEART RATE: 81 BPM

## 2024-08-22 PROCEDURE — 64615 CHEMODENERV MUSC MIGRAINE: CPT

## 2024-08-22 NOTE — PHYSICAL EXAM
[General Appearance - Alert] : alert [General Appearance - In No Acute Distress] : in no acute distress [General Appearance - Well Nourished] : well nourished [General Appearance - Well Developed] : well developed [General Appearance - Well-Appearing] : healthy appearing [Oriented To Time, Place, And Person] : oriented to person, place, and time [Impaired Insight] : insight and judgment were intact [Affect] : the affect was normal [Mood] : the mood was normal [Memory Recent] : recent memory was not impaired [Memory Remote] : remote memory was not impaired [Person] : oriented to person [Place] : oriented to place [Time] : oriented to time [Short Term Intact] : short term memory intact [Remote Intact] : remote memory intact [Registration Intact] : recent registration memory intact [Concentration Intact] : normal concentrating ability [Visual Intact] : visual attention was ~T not ~L decreased [Fluency] : fluency intact [Comprehension] : comprehension intact [Current Events] : adequate knowledge of current events [Past History] : adequate knowledge of personal past history [Vocabulary] : adequate range of vocabulary [Cranial Nerves Optic (II)] : visual acuity intact bilaterally,  visual fields full to confrontation, pupils equal round and reactive to light [Cranial Nerves Oculomotor (III)] : extraocular motion intact [Cranial Nerves Facial (VII)] : face symmetrical [Cranial Nerves Trigeminal (V)] : facial sensation intact symmetrically [Cranial Nerves Vestibulocochlear (VIII)] : hearing was intact bilaterally [Cranial Nerves Glossopharyngeal (IX)] : tongue and palate midline [Cranial Nerves Accessory (XI - Cranial And Spinal)] : head turning and shoulder shrug symmetric [Cranial Nerves Hypoglossal (XII)] : there was no tongue deviation with protrusion [Motor Tone] : muscle tone was normal in all four extremities [Motor Strength] : muscle strength was normal in all four extremities [No Muscle Atrophy] : normal bulk in all four extremities [Motor Handedness Right-Handed] : the patient is right hand dominant [Motor Strength Upper Extremities Bilaterally] : strength was normal in both upper extremities [Motor Strength Lower Extremities Bilaterally] : strength was normal in both lower extremities [Sensation Tactile Decrease] : light touch was intact [Allodynia] : ~T allodynia present [Balance] : balance was intact [Limited Balance] : balance was intact [Past-pointing] : there was no past-pointing [Dysdiadochokinesia Bilaterally] : not present [Neck Appearance] : the appearance of the neck was normal [FreeTextEntry1] : moderate decrease range of mtoin in paraspinals [Exaggerated Use Of Accessory Muscles For Inspiration] : no accessory muscle use [Edema] : there was no peripheral edema [Abnormal Walk] : normal gait [Involuntary Movements] : no involuntary movements were seen [Nail Clubbing] : no clubbing  or cyanosis of the fingernails [Musculoskeletal - Swelling] : no joint swelling seen [Skin Color & Pigmentation] : normal skin color and pigmentation [] : no rash

## 2024-08-22 NOTE — HISTORY OF PRESENT ILLNESS
[FreeTextEntry1] : Pt is here today for Botox injections for migraine prevention. Pt benefits from Botox injections. Does experience a wear off effect with an increase in migraine frequency the last 2-3 weeks before the next injections. Vyepti infusion is late  and patient has had an increase in migraine .  Discussed potential adverse effects of Botox injections including bleeding , bruising , infection , eye lidid , eye brow ptosis. Pt advised to avoid laying flat for 4 hours , do not exercise strenuously today and do not apply make- up or lotion to injection site. Pt verbalized understanding and wishes to proceed.    Ubrelvy has been helpful to abort migraine .  Pt was in ED 7/24/24 for migraine flare - up - received IV "cocktail " and then did a steroid taper and migraine improved.   [Headache] : headache [Chronic Headache] : chronic headache [Dizziness] : dizziness [Nausea] : nausea [Vomiting] : Vomiting [Photophobia] : photophobia [Phonophobia] : phonophobia [Neck Pain] : neck pain [> 15 days per month] : > 15 days per month

## 2024-09-04 ENCOUNTER — APPOINTMENT (OUTPATIENT)
Dept: NEUROLOGY | Facility: CLINIC | Age: 64
End: 2024-09-04

## 2024-09-04 PROCEDURE — 96365 THER/PROPH/DIAG IV INF INIT: CPT

## 2024-09-04 NOTE — HISTORY OF PRESENT ILLNESS
[Left upper extremity] : Left upper extremity [22g] : 22g [Start Time: ___] : Medication Start Time: [unfilled] [End Time: ___] : Medication End Time: [unfilled] [Medication Name: ___] : Medication Name: [unfilled] [Total Amount Administered: ___] : Total Amount Administered: [unfilled] [IV discontinued. Intact. No signs or symptoms of IV complications noted. Time: ___] : IV discontinued. Intact. No signs or symptoms of IV complications noted. Time: [unfilled] [Patient  instructed to seek medical attention with signs and symptoms of adverse effects] : Patient  instructed to seek medical attention with signs and symptoms of adverse effects [Patient left unit in no acute distress] : Patient left unit in no acute distress [Medications administered as ordered and tolerated well.] : Medications administered as ordered and tolerated well. [de-identified] : hand [de-identified] : 9:28 [de-identified] :  Generic: eptinezumab-JJMR 1 mg   Dx:  Occipital neuralgia (M54.81)   Provider Treatment Prescriber:	CHAR FONTAINE ROSEN, NOAH   Total Dose administered:   300mg                                             Start:  9:53                                   Stop: 10:24   Amount of drug waste: none   Infusion Rate: 200 ml/hr in 100 mL 0.9% NS   IV insertion time:  9:28                                            IV d/c time: 10:33   NDC#: 91484-212-50                       Lot#:  [id number]                                   Exp: 07/2026  CPT: 05584 Was this medication buy and bill?  No           Specialty Pharmacy: [facility]   VS prior to infusion @ 9:22- HR: 68 BP: 127/74 RR: 20 VS s/p infusion @ 10:31- HR: 72 BP: 115/75 RR: 18  Was there a treatment reaction? No Action Taken: N/A   Next Appointment: Wed 11/27 @ 9:00am (tasked Dr. Hitchcock to ask about timing with Botox injections)

## 2024-09-29 ENCOUNTER — EMERGENCY (EMERGENCY)
Facility: HOSPITAL | Age: 64
LOS: 1 days | Discharge: HOME | End: 2024-09-29
Attending: EMERGENCY MEDICINE
Payer: MEDICARE

## 2024-09-29 VITALS
TEMPERATURE: 98 F | HEIGHT: 64 IN | SYSTOLIC BLOOD PRESSURE: 140 MMHG | WEIGHT: 145.06 LBS | DIASTOLIC BLOOD PRESSURE: 81 MMHG | OXYGEN SATURATION: 97 % | HEART RATE: 90 BPM | RESPIRATION RATE: 17 BRPM

## 2024-09-29 DIAGNOSIS — Z98.890 OTHER SPECIFIED POSTPROCEDURAL STATES: Chronic | ICD-10-CM

## 2024-09-29 DIAGNOSIS — Z90.89 ACQUIRED ABSENCE OF OTHER ORGANS: Chronic | ICD-10-CM

## 2024-09-29 LAB
ALBUMIN SERPL ELPH-MCNC: 4 G/DL — SIGNIFICANT CHANGE UP (ref 3.3–5)
ALP SERPL-CCNC: 52 U/L — SIGNIFICANT CHANGE UP (ref 40–120)
ALT FLD-CCNC: 30 U/L — SIGNIFICANT CHANGE UP (ref 10–45)
ANION GAP SERPL CALC-SCNC: 15 MMOL/L — SIGNIFICANT CHANGE UP (ref 5–17)
AST SERPL-CCNC: 28 U/L — SIGNIFICANT CHANGE UP (ref 10–40)
BASOPHILS # BLD AUTO: 0.05 K/UL — SIGNIFICANT CHANGE UP (ref 0–0.2)
BASOPHILS NFR BLD AUTO: 0.3 % — SIGNIFICANT CHANGE UP (ref 0–2)
BILIRUB SERPL-MCNC: 0.3 MG/DL — SIGNIFICANT CHANGE UP (ref 0.2–1.2)
BUN SERPL-MCNC: 13 MG/DL — SIGNIFICANT CHANGE UP (ref 7–23)
CALCIUM SERPL-MCNC: 10 MG/DL — SIGNIFICANT CHANGE UP (ref 8.4–10.5)
CHLORIDE SERPL-SCNC: 107 MMOL/L — SIGNIFICANT CHANGE UP (ref 96–108)
CO2 SERPL-SCNC: 19 MMOL/L — LOW (ref 22–31)
CREAT SERPL-MCNC: 0.78 MG/DL — SIGNIFICANT CHANGE UP (ref 0.5–1.3)
EGFR: 85 ML/MIN/1.73M2 — SIGNIFICANT CHANGE UP
EOSINOPHIL # BLD AUTO: 0.09 K/UL — SIGNIFICANT CHANGE UP (ref 0–0.5)
EOSINOPHIL NFR BLD AUTO: 0.6 % — SIGNIFICANT CHANGE UP (ref 0–6)
GLUCOSE SERPL-MCNC: 104 MG/DL — HIGH (ref 70–99)
HCT VFR BLD CALC: 41.6 % — SIGNIFICANT CHANGE UP (ref 34.5–45)
HGB BLD-MCNC: 14 G/DL — SIGNIFICANT CHANGE UP (ref 11.5–15.5)
IMM GRANULOCYTES NFR BLD AUTO: 0.4 % — SIGNIFICANT CHANGE UP (ref 0–0.9)
LYMPHOCYTES # BLD AUTO: 14 % — SIGNIFICANT CHANGE UP (ref 13–44)
LYMPHOCYTES # BLD AUTO: 2.05 K/UL — SIGNIFICANT CHANGE UP (ref 1–3.3)
MCHC RBC-ENTMCNC: 30.7 PG — SIGNIFICANT CHANGE UP (ref 27–34)
MCHC RBC-ENTMCNC: 33.7 GM/DL — SIGNIFICANT CHANGE UP (ref 32–36)
MCV RBC AUTO: 91.2 FL — SIGNIFICANT CHANGE UP (ref 80–100)
MONOCYTES # BLD AUTO: 1.02 K/UL — HIGH (ref 0–0.9)
MONOCYTES NFR BLD AUTO: 7 % — SIGNIFICANT CHANGE UP (ref 2–14)
NEUTROPHILS # BLD AUTO: 11.35 K/UL — HIGH (ref 1.8–7.4)
NEUTROPHILS NFR BLD AUTO: 77.7 % — HIGH (ref 43–77)
NRBC # BLD: 0 /100 WBCS — SIGNIFICANT CHANGE UP (ref 0–0)
PLATELET # BLD AUTO: 233 K/UL — SIGNIFICANT CHANGE UP (ref 150–400)
POTASSIUM SERPL-MCNC: 4.5 MMOL/L — SIGNIFICANT CHANGE UP (ref 3.5–5.3)
POTASSIUM SERPL-SCNC: 4.5 MMOL/L — SIGNIFICANT CHANGE UP (ref 3.5–5.3)
PROT SERPL-MCNC: 7.2 G/DL — SIGNIFICANT CHANGE UP (ref 6–8.3)
RBC # BLD: 4.56 M/UL — SIGNIFICANT CHANGE UP (ref 3.8–5.2)
RBC # FLD: 13 % — SIGNIFICANT CHANGE UP (ref 10.3–14.5)
SODIUM SERPL-SCNC: 141 MMOL/L — SIGNIFICANT CHANGE UP (ref 135–145)
WBC # BLD: 14.62 K/UL — HIGH (ref 3.8–10.5)
WBC # FLD AUTO: 14.62 K/UL — HIGH (ref 3.8–10.5)

## 2024-09-29 PROCEDURE — 74177 CT ABD & PELVIS W/CONTRAST: CPT | Mod: 26,MC

## 2024-09-29 PROCEDURE — 99285 EMERGENCY DEPT VISIT HI MDM: CPT | Mod: GC

## 2024-09-29 RX ORDER — ACETAMINOPHEN 325 MG/1
1000 TABLET ORAL ONCE
Refills: 0 | Status: COMPLETED | OUTPATIENT
Start: 2024-09-29 | End: 2024-09-29

## 2024-09-29 RX ORDER — ONDANSETRON 2 MG/ML
4 INJECTION, SOLUTION INTRAMUSCULAR; INTRAVENOUS ONCE
Refills: 0 | Status: COMPLETED | OUTPATIENT
Start: 2024-09-29 | End: 2024-09-29

## 2024-09-29 RX ADMIN — ONDANSETRON 4 MILLIGRAM(S): 2 INJECTION, SOLUTION INTRAMUSCULAR; INTRAVENOUS at 22:00

## 2024-09-29 RX ADMIN — ACETAMINOPHEN 400 MILLIGRAM(S): 325 TABLET ORAL at 22:01

## 2024-09-29 NOTE — ED ADULT NURSE NOTE - OBJECTIVE STATEMENT
63 y/o F with PMHx of HTN, HLD presents to the ED with complaints of hematuria today. Patient with hx of migraines, reports feeling nauseous which she attributed to medication. States went to use the restroom and had burning with urination and pain to R groin. Notes had bleeding with urination, bright red blood noted on toilet paper. Patient notes pain has since resolved. Reports felt similar to prior kidney stones. Patient follows with urologist. Denies fever, chills. AOx4 and speaking coherently. Breathing is unlabored, spontaneous, and symmetrical. Abdomen is soft, nondistended, and nontender. No bladder distention. No peripheral edema noted. <2s capillary refill. Ambulatory with full ROM of all extremities.

## 2024-09-29 NOTE — ED PROVIDER NOTE - PROGRESS NOTE DETAILS
NEREYDA Luna PGY2- patient feeling better. passed PO challenge. given dose of cipro for UTI, rest of course sent to patient's pharmacy along with PRN zofran. Will dc home

## 2024-09-29 NOTE — ED PROVIDER NOTE - CLINICAL SUMMARY MEDICAL DECISION MAKING FREE TEXT BOX
64-year-old female presents with right lower quadrant pain associated with increased urine frequency nausea and vomiting.  Differential diagnosis includes pyelonephritis, appendicitis and kidney stone with or without infection.  Will get a CT of the abdomen pelvis to evaluate for kidney stones or appendicitis.  Will check a urinalysis to screen for infection.  Will reassess.

## 2024-09-29 NOTE — ED PROVIDER NOTE - OBJECTIVE STATEMENT
The patient is a 63 y/o F with past medical history of HTN, HLD, migraines, asthma, presenting with sudden onset RLQ pain starting around 5pm with associated nausea, vomiting, and hematuria. Pain is intermittent. Feels similar to prior kidney stones. The patient is a 65 y/o F with past medical history of HTN, HLD, migraines, asthma, presenting with sudden onset RLQ pain starting around 5pm with associated nausea, vomiting, and hematuria. Pain is intermittent. Feels similar to prior kidney stones.    Attendin-year-old female presents with nausea and vomiting associated with right lower quadrant pain since around 5 PM.  She does have a history of kidney stones but has not had a kidney stone episode for several years.  No fever or chills.  No diarrhea.  Patient does have increased urine frequency.

## 2024-09-29 NOTE — ED PROVIDER NOTE - PATIENT PORTAL LINK FT
You can access the FollowMyHealth Patient Portal offered by [facility] by registering at the following website: [email or web address]. By joining [facility]’s FollowMyHealth portal, you will also be able to view your health information using other applications (apps) compatible with our system.

## 2024-09-29 NOTE — ED PROVIDER NOTE - NSFOLLOWUPINSTRUCTIONS_ED_ALL_ED_FT
Please take the antibiotic we sent to your pharmacy as prescribed.     Take zofran only as needed for vomiting.    Rest and keep yourself hydrated with fluids.    Follow up with your urologist within the next week.      Urinary Tract Infection    A urinary tract infection (UTI) is an infection of any part of the urinary tract, which includes the kidneys, ureters, bladder, and urethra. Risk factors include ignoring your need to urinate, wiping back to front if female, being an uncircumcised male, and having diabetes or a weak immune system. Symptoms include frequent urination, pain or burning with urination, foul smelling urine, cloudy urine, pain in the lower abdomen, blood in the urine, and fever. If you were prescribed an antibiotic medicine, take it as told by your health care provider. Do not stop taking the antibiotic even if you start to feel better.    SEEK IMMEDIATE MEDICAL CARE IF YOU HAVE ANY OF THE FOLLOWING SYMPTOMS: severe back or abdominal pain, fever, inability to keep fluids or medicine down, dizziness/lightheadedness, or a change in mental status.

## 2024-09-30 ENCOUNTER — APPOINTMENT (OUTPATIENT)
Dept: PAIN MANAGEMENT | Facility: CLINIC | Age: 64
End: 2024-09-30

## 2024-09-30 VITALS
DIASTOLIC BLOOD PRESSURE: 73 MMHG | HEIGHT: 64 IN | HEART RATE: 71 BPM | WEIGHT: 143 LBS | SYSTOLIC BLOOD PRESSURE: 110 MMHG | BODY MASS INDEX: 24.41 KG/M2

## 2024-09-30 VITALS
SYSTOLIC BLOOD PRESSURE: 140 MMHG | DIASTOLIC BLOOD PRESSURE: 63 MMHG | HEART RATE: 68 BPM | OXYGEN SATURATION: 100 % | TEMPERATURE: 98 F | RESPIRATION RATE: 16 BRPM

## 2024-09-30 LAB
APPEARANCE UR: CLEAR — SIGNIFICANT CHANGE UP
BACTERIA # UR AUTO: ABNORMAL /HPF
BILIRUB UR-MCNC: NEGATIVE — SIGNIFICANT CHANGE UP
CAST: 4 /LPF — SIGNIFICANT CHANGE UP (ref 0–4)
COLOR SPEC: ABNORMAL
DIFF PNL FLD: ABNORMAL
GLUCOSE UR QL: NEGATIVE MG/DL — SIGNIFICANT CHANGE UP
KETONES UR-MCNC: ABNORMAL MG/DL
LEUKOCYTE ESTERASE UR-ACNC: ABNORMAL
NITRITE UR-MCNC: NEGATIVE — SIGNIFICANT CHANGE UP
PH UR: 8.5 (ref 5–8)
PROT UR-MCNC: SIGNIFICANT CHANGE UP MG/DL
RBC CASTS # UR COMP ASSIST: 70 /HPF — HIGH (ref 0–4)
SP GR SPEC: 1.01 — SIGNIFICANT CHANGE UP (ref 1–1.03)
SQUAMOUS # UR AUTO: 0 /HPF — SIGNIFICANT CHANGE UP (ref 0–5)
UROBILINOGEN FLD QL: 0.2 MG/DL — SIGNIFICANT CHANGE UP (ref 0.2–1)
WBC UR QL: 127 /HPF — HIGH (ref 0–5)

## 2024-09-30 PROCEDURE — 99213 OFFICE O/P EST LOW 20 MIN: CPT | Mod: 25

## 2024-09-30 PROCEDURE — 85025 COMPLETE CBC W/AUTO DIFF WBC: CPT

## 2024-09-30 PROCEDURE — 96375 TX/PRO/DX INJ NEW DRUG ADDON: CPT

## 2024-09-30 PROCEDURE — 81001 URINALYSIS AUTO W/SCOPE: CPT

## 2024-09-30 PROCEDURE — 99284 EMERGENCY DEPT VISIT MOD MDM: CPT | Mod: 25

## 2024-09-30 PROCEDURE — 83690 ASSAY OF LIPASE: CPT

## 2024-09-30 PROCEDURE — 96366 THER/PROPH/DIAG IV INF ADDON: CPT

## 2024-09-30 PROCEDURE — 87186 SC STD MICRODIL/AGAR DIL: CPT

## 2024-09-30 PROCEDURE — 64615 CHEMODENERV MUSC MIGRAINE: CPT

## 2024-09-30 PROCEDURE — 87086 URINE CULTURE/COLONY COUNT: CPT

## 2024-09-30 PROCEDURE — 74177 CT ABD & PELVIS W/CONTRAST: CPT | Mod: MC

## 2024-09-30 PROCEDURE — 80053 COMPREHEN METABOLIC PANEL: CPT

## 2024-09-30 PROCEDURE — 96365 THER/PROPH/DIAG IV INF INIT: CPT | Mod: XU

## 2024-09-30 RX ORDER — ONDANSETRON 2 MG/ML
1 INJECTION, SOLUTION INTRAMUSCULAR; INTRAVENOUS
Qty: 1 | Refills: 0
Start: 2024-09-30

## 2024-09-30 RX ADMIN — Medication 500 MILLIGRAM(S): at 01:40

## 2024-09-30 RX ADMIN — ACETAMINOPHEN 1000 MILLIGRAM(S): 325 TABLET ORAL at 02:21

## 2024-09-30 NOTE — HISTORY OF PRESENT ILLNESS
[FreeTextEntry1] : Pt ntoes that she had ended up in ED over summer and improved with Benadryl, Reglan, ketorolac. Had been started on mg with [facility] and initially at 200mg and then recently increased to 400mg.   Has had some improvement which she thinks related to Botox + VYepti. Did notice weather changes.  had tried to hold off of steroid because of gyn procedure but  Did have pain in lower  Would schedule for iv infusion with mg and steroid when seeing [facility] and then push back iv infusion by 2 weeks. Does have sinus issue certified by 911.  Does note inflammatory response and can be trigger for these events.  "Once migraine gone asthma kicks in." Now living with sister who notes that she has worsened as well.

## 2024-10-03 LAB
-  AMOXICILLIN/CLAVULANIC ACID: SIGNIFICANT CHANGE UP
-  AMPICILLIN/SULBACTAM: SIGNIFICANT CHANGE UP
-  AMPICILLIN: SIGNIFICANT CHANGE UP
-  AZTREONAM: SIGNIFICANT CHANGE UP
-  CEFAZOLIN: SIGNIFICANT CHANGE UP
-  CEFEPIME: SIGNIFICANT CHANGE UP
-  CEFOXITIN: SIGNIFICANT CHANGE UP
-  CEFTRIAXONE: SIGNIFICANT CHANGE UP
-  CEFUROXIME: SIGNIFICANT CHANGE UP
-  CIPROFLOXACIN: SIGNIFICANT CHANGE UP
-  ERTAPENEM: SIGNIFICANT CHANGE UP
-  GENTAMICIN: SIGNIFICANT CHANGE UP
-  IMIPENEM: SIGNIFICANT CHANGE UP
-  LEVOFLOXACIN: SIGNIFICANT CHANGE UP
-  MEROPENEM: SIGNIFICANT CHANGE UP
-  NITROFURANTOIN: SIGNIFICANT CHANGE UP
-  PIPERACILLIN/TAZOBACTAM: SIGNIFICANT CHANGE UP
-  TOBRAMYCIN: SIGNIFICANT CHANGE UP
-  TRIMETHOPRIM/SULFAMETHOXAZOLE: SIGNIFICANT CHANGE UP
CULTURE RESULTS: ABNORMAL
METHOD TYPE: SIGNIFICANT CHANGE UP
ORGANISM # SPEC MICROSCOPIC CNT: ABNORMAL
ORGANISM # SPEC MICROSCOPIC CNT: ABNORMAL
SPECIMEN SOURCE: SIGNIFICANT CHANGE UP

## 2024-11-19 ENCOUNTER — APPOINTMENT (OUTPATIENT)
Dept: PHYSICAL MEDICINE AND REHAB | Facility: CLINIC | Age: 64
End: 2024-11-19
Payer: MEDICARE

## 2024-11-19 DIAGNOSIS — M54.81 OCCIPITAL NEURALGIA: ICD-10-CM

## 2024-11-19 PROCEDURE — 99213 OFFICE O/P EST LOW 20 MIN: CPT | Mod: 25

## 2024-11-19 PROCEDURE — 64405 NJX AA&/STRD GR OCPL NRV: CPT | Mod: RT

## 2024-11-21 ENCOUNTER — APPOINTMENT (OUTPATIENT)
Dept: PAIN MANAGEMENT | Facility: CLINIC | Age: 64
End: 2024-11-21
Payer: MEDICARE

## 2024-11-21 ENCOUNTER — APPOINTMENT (OUTPATIENT)
Dept: NEUROLOGY | Facility: CLINIC | Age: 64
End: 2024-11-21

## 2024-11-21 VITALS
DIASTOLIC BLOOD PRESSURE: 78 MMHG | WEIGHT: 143 LBS | SYSTOLIC BLOOD PRESSURE: 124 MMHG | BODY MASS INDEX: 24.41 KG/M2 | HEIGHT: 64 IN | HEART RATE: 76 BPM

## 2024-11-21 PROCEDURE — 96374 THER/PROPH/DIAG INJ IV PUSH: CPT

## 2024-11-21 PROCEDURE — 64615 CHEMODENERV MUSC MIGRAINE: CPT

## 2024-11-21 RX ORDER — METHYLPREDNISOLONE 125 MG/2ML
125 INJECTION, POWDER, LYOPHILIZED, FOR SOLUTION INTRAMUSCULAR; INTRAVENOUS
Qty: 1 | Refills: 0 | Status: ACTIVE | OUTPATIENT
Start: 2024-11-21

## 2024-12-03 ENCOUNTER — APPOINTMENT (OUTPATIENT)
Dept: PHYSICAL MEDICINE AND REHAB | Facility: CLINIC | Age: 64
End: 2024-12-03
Payer: MEDICARE

## 2024-12-03 DIAGNOSIS — M54.81 OCCIPITAL NEURALGIA: ICD-10-CM

## 2024-12-03 PROCEDURE — 64405 NJX AA&/STRD GR OCPL NRV: CPT | Mod: LT

## 2024-12-09 ENCOUNTER — APPOINTMENT (OUTPATIENT)
Dept: NEUROLOGY | Facility: CLINIC | Age: 64
End: 2024-12-09

## 2024-12-09 PROCEDURE — 96365 THER/PROPH/DIAG IV INF INIT: CPT

## 2024-12-09 RX ORDER — MAGNESIUM SULFATE HEPTAHYDRATE 1 G/100ML
1-5 INJECTION, SOLUTION INTRAVENOUS
Qty: 1 | Refills: 0 | Status: ACTIVE | OUTPATIENT
Start: 2024-12-09

## 2025-01-02 RX ORDER — KETOROLAC TROMETHAMINE 10 MG/1
10 TABLET, FILM COATED ORAL
Qty: 12 | Refills: 0 | Status: ACTIVE | COMMUNITY
Start: 2025-01-02 | End: 1900-01-01

## 2025-02-07 ENCOUNTER — APPOINTMENT (OUTPATIENT)
Dept: PHYSICAL MEDICINE AND REHAB | Facility: CLINIC | Age: 65
End: 2025-02-07

## 2025-02-07 DIAGNOSIS — M54.81 OCCIPITAL NEURALGIA: ICD-10-CM

## 2025-02-07 PROCEDURE — 64405 NJX AA&/STRD GR OCPL NRV: CPT | Mod: RT

## 2025-02-16 ENCOUNTER — NON-APPOINTMENT (OUTPATIENT)
Age: 65
End: 2025-02-16

## 2025-03-05 ENCOUNTER — APPOINTMENT (OUTPATIENT)
Dept: PHYSICAL MEDICINE AND REHAB | Facility: CLINIC | Age: 65
End: 2025-03-05
Payer: MEDICARE

## 2025-03-05 ENCOUNTER — APPOINTMENT (OUTPATIENT)
Dept: PAIN MANAGEMENT | Facility: CLINIC | Age: 65
End: 2025-03-05

## 2025-03-05 VITALS
DIASTOLIC BLOOD PRESSURE: 84 MMHG | HEART RATE: 80 BPM | HEIGHT: 64 IN | SYSTOLIC BLOOD PRESSURE: 131 MMHG | BODY MASS INDEX: 23.56 KG/M2 | WEIGHT: 138 LBS

## 2025-03-05 DIAGNOSIS — M54.81 OCCIPITAL NEURALGIA: ICD-10-CM

## 2025-03-05 PROCEDURE — 64615 CHEMODENERV MUSC MIGRAINE: CPT

## 2025-03-05 PROCEDURE — 64405 NJX AA&/STRD GR OCPL NRV: CPT | Mod: LT

## 2025-03-05 PROCEDURE — 99214 OFFICE O/P EST MOD 30 MIN: CPT | Mod: 25

## 2025-03-18 ENCOUNTER — APPOINTMENT (OUTPATIENT)
Dept: NEUROLOGY | Facility: CLINIC | Age: 65
End: 2025-03-18

## 2025-03-26 ENCOUNTER — TRANSCRIPTION ENCOUNTER (OUTPATIENT)
Age: 65
End: 2025-03-26

## 2025-04-01 ENCOUNTER — TRANSCRIPTION ENCOUNTER (OUTPATIENT)
Age: 65
End: 2025-04-01

## 2025-04-08 ENCOUNTER — NON-APPOINTMENT (OUTPATIENT)
Age: 65
End: 2025-04-08

## 2025-04-14 ENCOUNTER — APPOINTMENT (OUTPATIENT)
Dept: NEUROLOGY | Facility: CLINIC | Age: 65
End: 2025-04-14
Payer: MEDICARE

## 2025-04-14 PROCEDURE — 96365 THER/PROPH/DIAG IV INF INIT: CPT

## 2025-05-01 ENCOUNTER — APPOINTMENT (OUTPATIENT)
Dept: PAIN MANAGEMENT | Facility: CLINIC | Age: 65
End: 2025-05-01

## 2025-05-14 ENCOUNTER — APPOINTMENT (OUTPATIENT)
Dept: PHYSICAL MEDICINE AND REHAB | Facility: CLINIC | Age: 65
End: 2025-05-14
Payer: MEDICARE

## 2025-05-14 DIAGNOSIS — M54.81 OCCIPITAL NEURALGIA: ICD-10-CM

## 2025-05-14 PROCEDURE — 99213 OFFICE O/P EST LOW 20 MIN: CPT | Mod: 25

## 2025-05-14 PROCEDURE — 64405 NJX AA&/STRD GR OCPL NRV: CPT | Mod: RT

## 2025-05-22 ENCOUNTER — APPOINTMENT (OUTPATIENT)
Dept: PAIN MANAGEMENT | Facility: CLINIC | Age: 65
End: 2025-05-22

## 2025-05-28 ENCOUNTER — APPOINTMENT (OUTPATIENT)
Dept: PHYSICAL MEDICINE AND REHAB | Facility: CLINIC | Age: 65
End: 2025-05-28
Payer: MEDICARE

## 2025-05-28 DIAGNOSIS — R51.9 HEADACHE, UNSPECIFIED: ICD-10-CM

## 2025-05-28 DIAGNOSIS — M54.81 OCCIPITAL NEURALGIA: ICD-10-CM

## 2025-05-28 PROCEDURE — 99213 OFFICE O/P EST LOW 20 MIN: CPT | Mod: 25

## 2025-05-28 PROCEDURE — 64405 NJX AA&/STRD GR OCPL NRV: CPT | Mod: LT

## 2025-06-02 ENCOUNTER — APPOINTMENT (OUTPATIENT)
Dept: PAIN MANAGEMENT | Facility: CLINIC | Age: 65
End: 2025-06-02

## 2025-06-02 VITALS
SYSTOLIC BLOOD PRESSURE: 122 MMHG | BODY MASS INDEX: 23.39 KG/M2 | WEIGHT: 137 LBS | DIASTOLIC BLOOD PRESSURE: 74 MMHG | HEIGHT: 64 IN | HEART RATE: 69 BPM

## 2025-06-02 PROCEDURE — 99214 OFFICE O/P EST MOD 30 MIN: CPT | Mod: 25

## 2025-06-02 PROCEDURE — 64615 CHEMODENERV MUSC MIGRAINE: CPT

## 2025-06-02 RX ORDER — ONDANSETRON 4 MG/1
4 TABLET, ORALLY DISINTEGRATING ORAL
Qty: 28 | Refills: 1 | Status: ACTIVE | COMMUNITY
Start: 2025-06-02 | End: 1900-01-01

## 2025-06-16 ENCOUNTER — APPOINTMENT (OUTPATIENT)
Dept: PAIN MANAGEMENT | Facility: CLINIC | Age: 65
End: 2025-06-16

## 2025-07-16 ENCOUNTER — APPOINTMENT (OUTPATIENT)
Dept: NEUROLOGY | Facility: CLINIC | Age: 65
End: 2025-07-16
Payer: MEDICARE

## 2025-07-16 PROCEDURE — 96365 THER/PROPH/DIAG IV INF INIT: CPT

## 2025-07-21 ENCOUNTER — APPOINTMENT (OUTPATIENT)
Dept: NEUROLOGY | Facility: CLINIC | Age: 65
End: 2025-07-21

## 2025-08-08 ENCOUNTER — TRANSCRIPTION ENCOUNTER (OUTPATIENT)
Age: 65
End: 2025-08-08

## 2025-08-19 ENCOUNTER — APPOINTMENT (OUTPATIENT)
Dept: PHYSICAL MEDICINE AND REHAB | Facility: CLINIC | Age: 65
End: 2025-08-19
Payer: MEDICARE

## 2025-08-19 DIAGNOSIS — R51.9 HEADACHE, UNSPECIFIED: ICD-10-CM

## 2025-08-19 DIAGNOSIS — M54.81 OCCIPITAL NEURALGIA: ICD-10-CM

## 2025-08-19 PROCEDURE — 99213 OFFICE O/P EST LOW 20 MIN: CPT | Mod: 25

## 2025-08-19 PROCEDURE — 64405 NJX AA&/STRD GR OCPL NRV: CPT | Mod: RT

## 2025-09-08 ENCOUNTER — APPOINTMENT (OUTPATIENT)
Dept: PAIN MANAGEMENT | Facility: CLINIC | Age: 65
End: 2025-09-08

## 2025-09-09 ENCOUNTER — APPOINTMENT (OUTPATIENT)
Dept: PHYSICAL MEDICINE AND REHAB | Facility: CLINIC | Age: 65
End: 2025-09-09

## 2025-09-09 DIAGNOSIS — R51.9 HEADACHE, UNSPECIFIED: ICD-10-CM

## 2025-09-09 DIAGNOSIS — M54.81 OCCIPITAL NEURALGIA: ICD-10-CM

## (undated) DEVICE — LUBRICATING JELLY ONESHOT 1.25OZ

## (undated) DEVICE — DRSG TELFA 3 X 8

## (undated) DEVICE — SYR CONTROL LUER LOK 10CC

## (undated) DEVICE — ENDO SCRUB

## (undated) DEVICE — BLADE MEDTRONIC ENT TRICUT ROTATABLE STRAIGHT 4MM X 11CM

## (undated) DEVICE — DRSG SPLINT INTRA NASAL .5MM STANDARD THICK

## (undated) DEVICE — PACK SMR

## (undated) DEVICE — POSITIONER FOAM EGG CRATE ULNAR 2PCS (PINK)

## (undated) DEVICE — CANISTER DISPOSABLE THIN WALL 3000CC

## (undated) DEVICE — CATH IV SAFE INSYTE 14G X 1.75" (ORANGE)

## (undated) DEVICE — NDL HYPO REGULAR BEVEL 25G X 1.5" (BLUE)

## (undated) DEVICE — SUT ETHILON 3-0 30" FS-1

## (undated) DEVICE — SOL IRR POUR NS 0.9% 500ML

## (undated) DEVICE — CLEANING SHEATH ENDO-SCRUB FOR STORZ 7210AA TELESCOPE 4MM 0 DEGREE

## (undated) DEVICE — TUBING SUCTION NONCONDUCTIVE 6MM X 12FT

## (undated) DEVICE — DRSG SPLINT INTRA NASAL .5MM OVERSIZE THICK

## (undated) DEVICE — VENODYNE/SCD SLEEVE CALF MEDIUM

## (undated) DEVICE — BLADE MEDTRONIC ENT FUSION TRICUT ROTATABLE STRAIGHT 4MM X 13CM

## (undated) DEVICE — Device

## (undated) DEVICE — ELCTR BOVIE SUCTION 8FR 6"

## (undated) DEVICE — SYR LUER LOK 5CC

## (undated) DEVICE — TUBING IRRIGATION STRAIGHT SHOT

## (undated) DEVICE — MEDTRONIC AXIEM PATIENT TRACKER NON-INVASIVE

## (undated) DEVICE — GLV 7 PROTEXIS (WHITE)

## (undated) DEVICE — LABELS BLANK W PEN

## (undated) DEVICE — STRYKER MALLEABLE SUCTION MEDIUM STANDARD

## (undated) DEVICE — DRSG NASOPORE 8CM FIRM

## (undated) DEVICE — MEDTRONIC INSTRUMENT TRACKER ENT

## (undated) DEVICE — POSITIONER STRAP ARMBOARD VELCRO TS-30

## (undated) DEVICE — ACCLARENT SET INFLATION DEVICE

## (undated) DEVICE — PAD MEDTRONIC ENT ADHESIVE PAD

## (undated) DEVICE — DRSG NASOPORE 4CM FIRM

## (undated) DEVICE — WARMING BLANKET FULL UNDERBODY

## (undated) DEVICE — SUT CHROMIC 4-0 18" G-2

## (undated) DEVICE — ELCTR COAGULATOR HANDSWITCHING 10FR

## (undated) DEVICE — SUT PLAIN GUT 4-0 18" SC-1

## (undated) DEVICE — SOL ANTI FOG

## (undated) DEVICE — LIJ-MEDTRONIC FUSION ENT NAVIGATION SET: Type: DURABLE MEDICAL EQUIPMENT